# Patient Record
Sex: MALE | Race: WHITE | NOT HISPANIC OR LATINO | Employment: OTHER | URBAN - METROPOLITAN AREA
[De-identification: names, ages, dates, MRNs, and addresses within clinical notes are randomized per-mention and may not be internally consistent; named-entity substitution may affect disease eponyms.]

---

## 2019-03-08 LAB
CHOLESTEROL, TOTAL: 135 (ref 100–199)
HDLC SERPL-MCNC: 48 MG/DL
LDLC SERPL CALC-MCNC: 75 MG/DL (ref 0–99)
TRIGL SERPL-MCNC: 61 MG/DL (ref 0–149)
VLDL CHOLESTEROL: 12 MG/DL (ref 5–40)

## 2020-05-27 RX ORDER — METFORMIN HYDROCHLORIDE 500 MG/1
500 TABLET, EXTENDED RELEASE ORAL DAILY
Qty: 90 TABLET | Refills: 3 | Status: SHIPPED | OUTPATIENT
Start: 2020-05-27

## 2020-05-27 RX ORDER — METFORMIN HYDROCHLORIDE 500 MG/1
TABLET, EXTENDED RELEASE ORAL
Qty: 90 TABLET | Refills: 3 | Status: SHIPPED | OUTPATIENT
Start: 2020-05-27 | End: 2020-05-27 | Stop reason: SDUPTHER

## 2020-09-22 ENCOUNTER — OFFICE VISIT (OUTPATIENT)
Dept: INTERNAL MEDICINE | Facility: CLINIC | Age: 65
End: 2020-09-22
Payer: COMMERCIAL

## 2020-09-22 VITALS
HEART RATE: 77 BPM | WEIGHT: 271.5 LBS | SYSTOLIC BLOOD PRESSURE: 137 MMHG | TEMPERATURE: 98 F | HEIGHT: 71 IN | DIASTOLIC BLOOD PRESSURE: 81 MMHG | BODY MASS INDEX: 38.01 KG/M2

## 2020-09-22 DIAGNOSIS — Z11.59 ENCOUNTER FOR HEPATITIS C SCREENING TEST FOR LOW RISK PATIENT: ICD-10-CM

## 2020-09-22 DIAGNOSIS — G47.33 OBSTRUCTIVE SLEEP APNEA (ADULT) (PEDIATRIC): ICD-10-CM

## 2020-09-22 DIAGNOSIS — J44.9 CHRONIC OBSTRUCTIVE PULMONARY DISEASE, UNSPECIFIED COPD TYPE: ICD-10-CM

## 2020-09-22 DIAGNOSIS — H91.93 BILATERAL HEARING LOSS, UNSPECIFIED HEARING LOSS TYPE: ICD-10-CM

## 2020-09-22 DIAGNOSIS — Z11.4 ENCOUNTER FOR SCREENING FOR HIV: ICD-10-CM

## 2020-09-22 DIAGNOSIS — K21.9 GASTRO-ESOPHAGEAL REFLUX DISEASE WITHOUT ESOPHAGITIS: ICD-10-CM

## 2020-09-22 DIAGNOSIS — E78.00 PURE HYPERCHOLESTEROLEMIA, UNSPECIFIED: ICD-10-CM

## 2020-09-22 DIAGNOSIS — I10 ESSENTIAL (PRIMARY) HYPERTENSION: Primary | ICD-10-CM

## 2020-09-22 DIAGNOSIS — E66.01 CLASS 2 SEVERE OBESITY DUE TO EXCESS CALORIES WITH SERIOUS COMORBIDITY AND BODY MASS INDEX (BMI) OF 37.0 TO 37.9 IN ADULT: ICD-10-CM

## 2020-09-22 DIAGNOSIS — Z12.11 COLON CANCER SCREENING: ICD-10-CM

## 2020-09-22 DIAGNOSIS — N52.9 ERECTILE DYSFUNCTION, UNSPECIFIED ERECTILE DYSFUNCTION TYPE: ICD-10-CM

## 2020-09-22 DIAGNOSIS — R73.03 PREDIABETES: ICD-10-CM

## 2020-09-22 DIAGNOSIS — M17.0 BILATERAL PRIMARY OSTEOARTHRITIS OF KNEE: ICD-10-CM

## 2020-09-22 DIAGNOSIS — R06.02 SOB (SHORTNESS OF BREATH): ICD-10-CM

## 2020-09-22 PROCEDURE — 99214 PR OFFICE/OUTPT VISIT, EST, LEVL IV, 30-39 MIN: ICD-10-PCS | Mod: 25,S$GLB,, | Performed by: INTERNAL MEDICINE

## 2020-09-22 PROCEDURE — 90471 FLU VACCINE (QUAD) GREATER THAN OR EQUAL TO 3YO PRESERVATIVE FREE IM: ICD-10-PCS | Mod: S$GLB,,, | Performed by: INTERNAL MEDICINE

## 2020-09-22 PROCEDURE — 3008F BODY MASS INDEX DOCD: CPT | Mod: CPTII,S$GLB,, | Performed by: INTERNAL MEDICINE

## 2020-09-22 PROCEDURE — 90686 IIV4 VACC NO PRSV 0.5 ML IM: CPT | Mod: S$GLB,,, | Performed by: INTERNAL MEDICINE

## 2020-09-22 PROCEDURE — 99214 OFFICE O/P EST MOD 30 MIN: CPT | Mod: 25,S$GLB,, | Performed by: INTERNAL MEDICINE

## 2020-09-22 PROCEDURE — 90471 IMMUNIZATION ADMIN: CPT | Mod: S$GLB,,, | Performed by: INTERNAL MEDICINE

## 2020-09-22 PROCEDURE — 90686 FLU VACCINE (QUAD) GREATER THAN OR EQUAL TO 3YO PRESERVATIVE FREE IM: ICD-10-PCS | Mod: S$GLB,,, | Performed by: INTERNAL MEDICINE

## 2020-09-22 PROCEDURE — 3008F PR BODY MASS INDEX (BMI) DOCUMENTED: ICD-10-PCS | Mod: CPTII,S$GLB,, | Performed by: INTERNAL MEDICINE

## 2020-09-22 RX ORDER — IPRATROPIUM BROMIDE AND ALBUTEROL 20; 100 UG/1; UG/1
1 SPRAY, METERED RESPIRATORY (INHALATION) 4 TIMES DAILY
COMMUNITY
End: 2020-09-22

## 2020-09-22 RX ORDER — ALBUTEROL SULFATE 90 UG/1
2 AEROSOL, METERED RESPIRATORY (INHALATION) EVERY 6 HOURS PRN
Qty: 18 G | Refills: 0 | Status: SHIPPED | OUTPATIENT
Start: 2020-09-22 | End: 2022-01-11 | Stop reason: SDUPTHER

## 2020-09-22 RX ORDER — ASPIRIN 81 MG/1
81 TABLET ORAL NIGHTLY
COMMUNITY

## 2020-09-22 RX ORDER — LOSARTAN POTASSIUM 100 MG/1
100 TABLET ORAL NIGHTLY
Qty: 90 TABLET | Refills: 3 | Status: SHIPPED | OUTPATIENT
Start: 2020-09-22 | End: 2021-08-12

## 2020-09-22 RX ORDER — METFORMIN HYDROCHLORIDE 500 MG/1
500 TABLET ORAL 2 TIMES DAILY WITH MEALS
COMMUNITY
End: 2020-09-28 | Stop reason: SDUPTHER

## 2020-09-22 RX ORDER — ROSUVASTATIN CALCIUM 20 MG/1
20 TABLET, COATED ORAL NIGHTLY
COMMUNITY
Start: 2020-08-23 | End: 2022-01-11 | Stop reason: SDUPTHER

## 2020-09-22 RX ORDER — OMEPRAZOLE 20 MG/1
20 CAPSULE, DELAYED RELEASE ORAL EVERY MORNING
COMMUNITY
Start: 2020-08-29 | End: 2022-01-11 | Stop reason: SDUPTHER

## 2020-09-22 RX ORDER — FLUTICASONE FUROATE, UMECLIDINIUM BROMIDE AND VILANTEROL TRIFENATATE 100; 62.5; 25 UG/1; UG/1; UG/1
1 POWDER RESPIRATORY (INHALATION) EVERY MORNING
COMMUNITY
Start: 2020-07-18 | End: 2021-04-14

## 2020-09-22 NOTE — PROGRESS NOTES
Chief C/o:    Hypertension, Hyperlipidemia, Pre-diabetes, Pre-op Exam, and Shortness of Breath        Health Care Maintenance    Health Maintenance       Date Due Completion Date    Hepatitis C Screening 1955 ---    HIV Screening 12/05/1971 ---    TETANUS VACCINE 12/05/1974 ---    Shingles Vaccine (1 of 2) 12/05/2006 ---    Colorectal Cancer Screening 07/25/2018 7/25/2008 (Done)    Override on 7/25/2008: Done    Lipid Panel 03/09/2024 3/9/2019 (Done)    Override on 3/9/2019: Done                 HISTORY OF PRESENT ILLNESS:    ALCIDES Keys is a 64 y.o. male who presents to the clinic today for Hypertension, Hyperlipidemia, Pre-diabetes, Pre-op Exam, and Shortness of Breath  .  Patient shortness of breath is unchanged over a long time, he attributed it to his COPD.  Patient has obstructive sleep apnea but is unable to use his CPAP, he could not tolerate in the past.  Patient denies chest pain, there is no nausea, no vomiting, no fever, no chills.                  ALLERGIES AND MEDICATIONS: updated and reviewed.  Review of patient's allergies indicates:  No Known Allergies  Medication List with Changes/Refills   New Medications    ALBUTEROL (VENTOLIN HFA) 90 MCG/ACTUATION INHALER    Inhale 2 puffs into the lungs every 6 (six) hours as needed for Wheezing. Rescue    LOSARTAN (COZAAR) 100 MG TABLET    Take 1 tablet (100 mg total) by mouth every evening.   Current Medications    ASPIRIN (ECOTRIN) 81 MG EC TABLET    Take 81 mg by mouth once daily.    CARVEDILOL (COREG) 12.5 MG TABLET    TAKE 1 TABLET IN THE MORNING AND 1 TABLET AT NIGHT    METFORMIN (GLUCOPHAGE) 500 MG TABLET    Take 500 mg by mouth 2 (two) times daily with meals.    MULTIVIT-MIN/FA/LYCOPEN/LUTEIN (CENTRUM SILVER MEN ORAL)    Take by mouth.    OMEPRAZOLE (PRILOSEC) 20 MG CAPSULE        ROSUVASTATIN (CRESTOR) 20 MG TABLET        TRELEGY ELLIPTA 100-62.5-25 MCG DSDV        VITAMIN B COMPLEX-FOLIC ACID 50 MCG TAB    Take by mouth.    Discontinued Medications    IPRATROPIUM-ALBUTEROL (COMBIVENT RESPIMAT)  MCG/ACTUATION INHALER    Inhale 1 puff into the lungs 4 (four) times daily. Rescue    LOSARTAN (COZAAR) 50 MG TABLET    TAKE 1 TABLET EVERY EVENING             CARE TEAM:    Patient Care Team:  Rica Martinez MD as PCP - General (Internal Medicine)         REVIEW OF SYSTEMS:    Review of Systems   Constitutional: Negative for appetite change, chills, diaphoresis, fatigue, fever and unexpected weight change.   HENT: Negative for congestion, drooling, ear discharge, ear pain, facial swelling, hearing loss, nosebleeds, rhinorrhea, sinus pain, sneezing, sore throat, tinnitus, trouble swallowing and voice change.    Eyes: Negative for pain, discharge, redness, itching and visual disturbance.   Respiratory: Positive for shortness of breath. Negative for cough, choking, chest tightness, wheezing and stridor.    Cardiovascular: Negative for chest pain, palpitations and leg swelling.   Gastrointestinal: Negative for abdominal distention, abdominal pain, blood in stool, constipation, diarrhea, nausea and vomiting.   Endocrine: Negative for cold intolerance, heat intolerance, polydipsia, polyphagia and polyuria.   Genitourinary: Negative for difficulty urinating, dysuria, flank pain, frequency, hematuria and urgency.   Musculoskeletal: Negative for arthralgias, back pain, gait problem, joint swelling, myalgias, neck pain and neck stiffness.   Skin: Negative for color change, pallor, rash and wound.   Allergic/Immunologic: Negative for environmental allergies, food allergies and immunocompromised state.   Neurological: Negative for dizziness, tremors, seizures, syncope, speech difficulty, weakness, light-headedness, numbness and headaches.   Hematological: Negative for adenopathy. Does not bruise/bleed easily.   Psychiatric/Behavioral: Negative for agitation, behavioral problems, confusion, decreased concentration, dysphoric mood,  "hallucinations, sleep disturbance and suicidal ideas. The patient is not nervous/anxious.          PHYSICAL EXAM:    Vitals:    09/22/20 0927   BP: 137/81   Pulse: 77   Temp: 97.5 °F (36.4 °C)     Weight: 123.1 kg (271 lb 7.9 oz)   Height: 5' 11" (180.3 cm)   Body mass index is 37.87 kg/m².  Vitals:    09/22/20 0927   BP: 137/81   Pulse: 77   Temp: 97.5 °F (36.4 °C)   TempSrc: Temporal   Weight: 123.1 kg (271 lb 7.9 oz)   Height: 5' 11" (1.803 m)   PainSc: 0-No pain          Physical Exam   Constitutional: He is oriented to person, place, and time. He appears well-developed, well-nourished and obese.  Non-toxic appearance. He does not appear ill. No distress.   Comfortable, cooperative, in no apparent distress.   HENT:   Head: Normocephalic and atraumatic.   Right Ear: Tympanic membrane, external ear and ear canal normal.   Left Ear: Tympanic membrane, external ear and ear canal normal.   Nose: Nose normal. No rhinorrhea or nasal congestion.   Mouth/Throat: Oropharynx is clear and moist. Mucous membranes are moist. No oropharyngeal exudate or posterior oropharyngeal erythema. Oropharynx is clear.   Eyes: Pupils are equal, round, and reactive to light. Conjunctivae are normal. Right eye exhibits no discharge. Left eye exhibits no discharge. No scleral icterus.   Neck: Normal range of motion. Neck supple. No JVD present. No muscular tenderness present. No neck rigidity. No tracheal deviation present. No thyromegaly present.   Cardiovascular: Normal rate, regular rhythm, normal heart sounds and normal pulses. Exam reveals no gallop and no friction rub.   No murmur heard.  Pulmonary/Chest: Effort normal and breath sounds normal. No stridor. No respiratory distress. He has no wheezes. He has no rhonchi. He has no rales. He exhibits no tenderness.   Abdominal: Soft. Bowel sounds are normal. He exhibits no distension and no mass. There is no abdominal tenderness. There is no rebound and no guarding. No hernia. "   Genitourinary:    Genitourinary Comments: No costovertebral angle tenderness.     Musculoskeletal: Normal range of motion.         General: No swelling, tenderness, deformity or signs of injury.      Right lower leg: No edema.      Left lower leg: No edema.      Comments: Crepitation knees.   Lymphadenopathy:     He has no cervical adenopathy.   Neurological: He is alert and oriented to person, place, and time. He displays no weakness and normal reflexes. No cranial nerve deficit or sensory deficit. He exhibits normal muscle tone. Coordination and gait normal.   Skin: Skin is warm and dry. Capillary refill takes less than 2 seconds. No bruising, no lesion and no rash noted. He is not diaphoretic. No erythema. No jaundice or pallor.   Psychiatric: His behavior is normal. Mood, judgment and thought content normal.   Nursing note and vitals reviewed.         Labs:    No results found for: GLU, NA, K, CL, CO2, BUN, CREATININE, CALCIUM, PROT, ALBUMIN, BILITOT, ALKPHOS, AST, ALT, ANIONGAP, ESTGFRAFRICA, EGFRNONAA  No results found for: WBC, RBC, HGB, HCT, MCV, RDW, PLT   No results found for: CHOL, TRIG, HDL, LDLCALC, TOTALCHOLEST  No results found for: TSH  No results found for: HGBA1C, ESTIMATEDAVG       ASSESSMENT & PLAN:    1. Essential (primary) hypertension  - losartan (COZAAR) 100 MG tablet; Take 1 tablet (100 mg total) by mouth every evening.  Dispense: 90 tablet; Refill: 3  - Comprehensive metabolic panel; Future  - CBC auto differential; Future  - Comprehensive metabolic panel  - CBC auto differential    2. Chronic obstructive pulmonary disease, unspecified COPD type  - TRELEGY ELLIPTA 100-62.5-25 mcg DsDv  - albuterol (VENTOLIN HFA) 90 mcg/actuation inhaler; Inhale 2 puffs into the lungs every 6 (six) hours as needed for Wheezing. Rescue  Dispense: 18 g; Refill: 0    3. Prediabetes  - metFORMIN (GLUCOPHAGE) 500 MG tablet; Take 500 mg by mouth 2 (two) times daily with meals.  - Comprehensive metabolic panel;  Future  - Hemoglobin A1C; Future  - TSH; Future  - CBC auto differential; Future  - Comprehensive metabolic panel  - Hemoglobin A1C  - TSH  - CBC auto differential    4. Pure hypercholesterolemia, unspecified  - rosuvastatin (CRESTOR) 20 MG tablet  - Comprehensive metabolic panel; Future  - Lipid Panel; Future  - Comprehensive metabolic panel  - Lipid Panel    5. Bilateral hearing loss, unspecified hearing loss type    6. Obstructive sleep apnea (adult) (pediatric)    7. Gastro-esophageal reflux disease without esophagitis  - omeprazole (PRILOSEC) 20 MG capsule    8. Erectile dysfunction, unspecified erectile dysfunction type    9. Bilateral primary osteoarthritis of knee    10. SOB (shortness of breath)  - TRELEGY ELLIPTA 100-62.5-25 mcg DsDv    11. Class 2 severe obesity due to excess calories with serious comorbidity and body mass index (BMI) of 37.0 to 37.9 in adult    12. Colon cancer screening    13. Encounter for hepatitis C screening test for low risk patient  - Hepatitis C Antibody; Future  - Hepatitis C Antibody    14. Encounter for screening for HIV  - HIV 1/2 Ag/Ab (4th Gen); Future  - HIV 1/2 Ag/Ab (4th Gen)     Patient with multiple medical problems, he seems to be at baseline at this time, will continue his current medication.  Low-fat diet, increase vegetables, learn how to count calories, check weight every morning and keep a diet and weight diary.  Bring the diary next visit.  Try to identify one specific food item or habit that you can improve, try to stick to it and add another item after 2-4 weeks interval. If you are not improving as you wish, bring your food diary and we will try, together, find something specific that we can work on.  General measures: low salt, low fat, vegetables and fruits rich diet. Check BP before taking BP medications and follow precautions and guidelines. Keep a records of your BP and Pulse readings and bring the chart to the office next visit. If BP is consistently  above 130/80, I recommend that you book a sooner appointment to address the issue.  Patient so cardiologist and wants clearance for cataract surgery.  Cleared for cataract surgery.  Paperwork for clearance was signed and copies in media section of the HR.      Orders Placed This Encounter   Procedures    Influenza - Quadrivalent *Preferred* (6 months+) (PF)    Comprehensive metabolic panel    Lipid Panel    Hemoglobin A1C    Hepatitis C Antibody    TSH    HIV 1/2 Ag/Ab (4th Gen)    CBC auto differential      Follow up in about 2 months (around 11/22/2020). or sooner as needed.    Patient was counseled and questions and concerns were addressed.    Please note:  Parts of this report were done using a dictation software, voice to text, and sometimes the text contains some uncorrected words or sentences that are missed during revision.

## 2020-09-28 DIAGNOSIS — R73.03 PREDIABETES: ICD-10-CM

## 2020-09-28 RX ORDER — METFORMIN HYDROCHLORIDE 500 MG/1
500 TABLET ORAL 2 TIMES DAILY WITH MEALS
Qty: 90 TABLET | Refills: 1 | Status: SHIPPED | OUTPATIENT
Start: 2020-09-28 | End: 2020-10-01 | Stop reason: SDUPTHER

## 2020-10-01 DIAGNOSIS — R73.03 PREDIABETES: ICD-10-CM

## 2020-10-02 RX ORDER — METFORMIN HYDROCHLORIDE 500 MG/1
500 TABLET ORAL 2 TIMES DAILY WITH MEALS
Qty: 180 TABLET | Refills: 3 | Status: SHIPPED | OUTPATIENT
Start: 2020-10-02 | End: 2020-10-07 | Stop reason: SDUPTHER

## 2020-10-07 DIAGNOSIS — R73.03 PREDIABETES: ICD-10-CM

## 2020-10-07 RX ORDER — METFORMIN HYDROCHLORIDE 500 MG/1
500 TABLET ORAL 2 TIMES DAILY WITH MEALS
Qty: 180 TABLET | Refills: 3 | Status: SHIPPED | OUTPATIENT
Start: 2020-10-07 | End: 2022-01-11

## 2020-11-11 LAB
ALBUMIN SERPL-MCNC: 4.2 G/DL (ref 3.8–4.8)
ALBUMIN/GLOB SERPL: 1.4 {RATIO} (ref 1.2–2.2)
ALP SERPL-CCNC: 58 IU/L (ref 39–117)
ALT SERPL-CCNC: 21 IU/L (ref 0–44)
AST SERPL-CCNC: 16 IU/L (ref 0–40)
BASOPHILS # BLD AUTO: 0 X10E3/UL (ref 0–0.2)
BASOPHILS NFR BLD AUTO: 0 %
BILIRUB SERPL-MCNC: 0.4 MG/DL (ref 0–1.2)
BUN SERPL-MCNC: 12 MG/DL (ref 8–27)
BUN/CREAT SERPL: 13 (ref 10–24)
CALCIUM SERPL-MCNC: 9.5 MG/DL (ref 8.6–10.2)
CHLORIDE SERPL-SCNC: 103 MMOL/L (ref 96–106)
CHOLEST SERPL-MCNC: 131 MG/DL (ref 100–199)
CO2 SERPL-SCNC: 26 MMOL/L (ref 20–29)
CREAT SERPL-MCNC: 0.96 MG/DL (ref 0.76–1.27)
EOSINOPHIL # BLD AUTO: 0.2 X10E3/UL (ref 0–0.4)
EOSINOPHIL NFR BLD AUTO: 3 %
ERYTHROCYTE [DISTWIDTH] IN BLOOD BY AUTOMATED COUNT: 14.3 % (ref 11.6–15.4)
GLOBULIN SER CALC-MCNC: 2.9 G/DL (ref 1.5–4.5)
GLUCOSE SERPL-MCNC: 114 MG/DL (ref 65–99)
HBA1C MFR BLD: 6.2 % (ref 4.8–5.6)
HCT VFR BLD AUTO: 38.8 % (ref 37.5–51)
HCV AB S/CO SERPL IA: <0.1 S/CO RATIO (ref 0–0.9)
HDLC SERPL-MCNC: 47 MG/DL
HGB BLD-MCNC: 12.7 G/DL (ref 13–17.7)
HIV 1+2 AB+HIV1 P24 AG SERPL QL IA: NON REACTIVE
IMM GRANULOCYTES # BLD AUTO: 0 X10E3/UL (ref 0–0.1)
IMM GRANULOCYTES NFR BLD AUTO: 0 %
INTERPRETATION: NORMAL
LDLC SERPL CALC-MCNC: 65 MG/DL (ref 0–99)
LYMPHOCYTES # BLD AUTO: 1.9 X10E3/UL (ref 0.7–3.1)
LYMPHOCYTES NFR BLD AUTO: 29 %
MCH RBC QN AUTO: 27.5 PG (ref 26.6–33)
MCHC RBC AUTO-ENTMCNC: 32.7 G/DL (ref 31.5–35.7)
MCV RBC AUTO: 84 FL (ref 79–97)
MONOCYTES # BLD AUTO: 0.4 X10E3/UL (ref 0.1–0.9)
MONOCYTES NFR BLD AUTO: 7 %
NEUTROPHILS # BLD AUTO: 3.9 X10E3/UL (ref 1.4–7)
NEUTROPHILS NFR BLD AUTO: 61 %
PLATELET # BLD AUTO: 222 X10E3/UL (ref 150–450)
POTASSIUM SERPL-SCNC: 5.2 MMOL/L (ref 3.5–5.2)
PROT SERPL-MCNC: 7.1 G/DL (ref 6–8.5)
RBC # BLD AUTO: 4.62 X10E6/UL (ref 4.14–5.8)
SODIUM SERPL-SCNC: 139 MMOL/L (ref 134–144)
TRIGL SERPL-MCNC: 100 MG/DL (ref 0–149)
TSH SERPL DL<=0.005 MIU/L-ACNC: 1.01 UIU/ML (ref 0.45–4.5)
VLDLC SERPL CALC-MCNC: 19 MG/DL (ref 5–40)
WBC # BLD AUTO: 6.4 X10E3/UL (ref 3.4–10.8)

## 2020-11-16 ENCOUNTER — OFFICE VISIT (OUTPATIENT)
Dept: URGENT CARE | Facility: CLINIC | Age: 65
End: 2020-11-16
Payer: COMMERCIAL

## 2020-11-16 VITALS
SYSTOLIC BLOOD PRESSURE: 157 MMHG | OXYGEN SATURATION: 100 % | DIASTOLIC BLOOD PRESSURE: 73 MMHG | WEIGHT: 271 LBS | BODY MASS INDEX: 37.94 KG/M2 | HEART RATE: 73 BPM | TEMPERATURE: 98 F | RESPIRATION RATE: 18 BRPM | HEIGHT: 71 IN

## 2020-11-16 DIAGNOSIS — T14.8XXA PUNCTURE WOUND: Primary | ICD-10-CM

## 2020-11-16 PROCEDURE — 73630 XR FOOT COMPLETE 3 VIEW RIGHT: ICD-10-PCS | Mod: RT,S$GLB,, | Performed by: RADIOLOGY

## 2020-11-16 PROCEDURE — 99213 OFFICE O/P EST LOW 20 MIN: CPT | Mod: 25,S$GLB,, | Performed by: NURSE PRACTITIONER

## 2020-11-16 PROCEDURE — 90715 TDAP VACCINE 7 YRS/> IM: CPT | Mod: S$GLB,,, | Performed by: EMERGENCY MEDICINE

## 2020-11-16 PROCEDURE — 3008F BODY MASS INDEX DOCD: CPT | Mod: CPTII,S$GLB,, | Performed by: NURSE PRACTITIONER

## 2020-11-16 PROCEDURE — 73630 X-RAY EXAM OF FOOT: CPT | Mod: RT,S$GLB,, | Performed by: RADIOLOGY

## 2020-11-16 PROCEDURE — 3008F PR BODY MASS INDEX (BMI) DOCUMENTED: ICD-10-PCS | Mod: CPTII,S$GLB,, | Performed by: NURSE PRACTITIONER

## 2020-11-16 PROCEDURE — 90471 IMMUNIZATION ADMIN: CPT | Mod: S$GLB,,, | Performed by: EMERGENCY MEDICINE

## 2020-11-16 PROCEDURE — 90715 TDAP VACCINE GREATER THAN OR EQUAL TO 7YO IM: ICD-10-PCS | Mod: S$GLB,,, | Performed by: EMERGENCY MEDICINE

## 2020-11-16 PROCEDURE — 90471 TDAP VACCINE GREATER THAN OR EQUAL TO 7YO IM: ICD-10-PCS | Mod: S$GLB,,, | Performed by: EMERGENCY MEDICINE

## 2020-11-16 PROCEDURE — 99213 PR OFFICE/OUTPT VISIT, EST, LEVL III, 20-29 MIN: ICD-10-PCS | Mod: 25,S$GLB,, | Performed by: NURSE PRACTITIONER

## 2020-11-16 RX ORDER — LEVOFLOXACIN 500 MG/1
500 TABLET, FILM COATED ORAL DAILY
Qty: 5 TABLET | Refills: 0 | Status: SHIPPED | OUTPATIENT
Start: 2020-11-16 | End: 2020-11-21

## 2020-11-16 NOTE — PROGRESS NOTES
"Subjective:       Patient ID: Markell Keys is a 64 y.o. male.    Vitals:  height is 5' 11" (1.803 m) and weight is 122.9 kg (271 lb). His tympanic temperature is 98 °F (36.7 °C). His blood pressure is 157/73 (abnormal) and his pulse is 73. His respiration is 18 and oxygen saturation is 100%.     Chief Complaint: Puncture Wound    Pt was working in his garage this morning about 1100 hours.  Pt stepped onto a rusted nail which went thru his shoe.  Pt suspects about 1/2 inch puncture wound going thru the ball of his right foot near 1st digit.  Pt just wrapped it up and came straight to urgent care.  Pt states he does not think the keenan nail broke apart.  Pt states he does not know his tetanus shot status.    Laceration   The incident occurred 3 to 6 hours ago. The laceration is located on the right foot. The laceration is 1 cm in size. The laceration mechanism was a nail. The pain is at a severity of 4/10. The pain is moderate. The pain has been constant since onset. He reports no foreign bodies present. His tetanus status is unknown.       Musculoskeletal: Positive for pain, trauma and pain with walking. Negative for arthritis and gout.   Skin: Positive for laceration, puncture wound and erythema (2mm laceration right metatarsal).   Neurological: Negative for passing out.       Objective:      Physical Exam   Constitutional: He is oriented to person, place, and time.   HENT:   Head: Normocephalic and atraumatic.   Cardiovascular: Bradycardia present.   Pulmonary/Chest: Effort normal. No respiratory distress.   Abdominal: Normal appearance.   Musculoskeletal:      Right foot: Laceration present.   Neurological: He is alert and oriented to person, place, and time.   Skin: Skin is dry. Lacerations - lower ext.:  right footLesions:  erythema (2mm laceration right metatarsal)Psychiatric: His behavior is normal. Mood normal.           X-ray Foot Complete Right    Result Date: 11/16/2020  EXAMINATION: XR FOOT COMPLETE 3 " VIEW RIGHT CLINICAL HISTORY: . Other injury of unspecified body region, initial encounter TECHNIQUE: AP, lateral, and oblique views of the right foot were performed. COMPARISON: None FINDINGS: Multiple views of the right foot with an area of concern identified as the 1st metatarsophalangeal joint reveals no apparent evidence of an acute fracture injury of the phalanges or the metatarsals.  The 1st metatarsophalangeal joint is unremarkable.  No indication of inflammation or soft tissue swelling.  The articular surfaces are smooth throughout the foot.  The lateral projection reveals osteophytes developing on the calcaneus at the attachment point of the Achilles tendon and plantar fascia.  In aero directed to the plantar surface of the foot does not identify any specific focal abnormality.     No obvious evidence of an acute osseous injury involving the right foot. Minor osteoarthritic changes of the calcaneus. No specific gross abnormality noted in the area of concern as identified with an arrow. Electronically signed by: Mayank Cason Date:    11/16/2020 Time:    14:38    Assessment:       1. Puncture wound        Plan:       Wound cleaned.  Tdap.  Antibiotics to prevent infection.  Personally read x ray.  No fracture noted.    Puncture wound  -     X-Ray Foot Complete Right; Future; Expected date: 11/16/2020  -     (In Office Administered) Tdap Vaccine  -     levoFLOXacin (LEVAQUIN) 500 MG tablet; Take 1 tablet (500 mg total) by mouth once daily. for 5 days  Dispense: 5 tablet; Refill: 0         Patient Instructions     Puncture Wound: Foot       A puncture wound occurs when a pointed object (such as a nail) pushes into the skin. It may go into the tissues below the skin of the foot, including fat and muscle. This type of wound is narrow and deep. They can be hard to clean. Puncture wounds are at high risk for becoming infected. One type of serious infection is more likely if you were wearing a rubber-soled shoe at  the time of injury. Bacteria from the sole of the shoe may be dragged into the wound. Symptoms of infection may appear as late as 2 to 3 weeks after the injury. Be sure to watch for symptoms of infection and call your healthcare provider right away if any them appear.  X-rays may be done to see whether any objects remain under the skin. Your may also need a tetanus shot. This is given if you are not up to date on this vaccination and the object that caused the wound may lead to tetanus.  Puncture wounds can easily become infected.   Home care  · When you sit or lie down, raise the foot above the level of your heart. This helps reduce swelling and pain.  · Do not put weight on the injured foot if it hurts to do so or if you were told to keep weight off the injury.  · Your healthcare provider may prescribe an antibiotic. This is to help prevent infection. Follow all instructions for taking this medicine. Take the medicine every day until it is gone or you are told to stop. You should not have any left over.  · The healthcare provider may prescribe medicines for pain. Follow instructions for taking them.  · You can take acetaminophen or ibuprofen for pain, unless you were given a different pain medicine to use.   · Follow the healthcare providers instructions on how to care for the wound.  · Keep the wound clean and dry. Do not get the wound wet until you are told it is okay to do so. If the area gets wet, gently pat it dry with a clean cloth. Replace the wet bandage with a dry one.  · If a bandage was applied and it becomes wet or dirty, replace it. Otherwise, leave it in place for the first 24 hours.  · Once you can get the wound wet, you may shower as usual but do not soak the wound in water (no tub baths or swimming)  · Check the wound daily for symptoms of infection. These include:  ¨ Increasing redness or swelling around the wound  ¨ Increased warmth of the wound  ¨ Worsening pain  ¨ Red streaking lines away  from the wound  ¨ Draining pus  Follow-up care  Follow up with your healthcare provider as advised.   When to seek medical advice  Call your healthcare provider right away if any of these occur:  · Any symptoms of infection (listed above)  · Fever of 100.4°F (38.ºC) or higher, or as directed by your healthcare provider  · Wound changes colors  · Numbness around the wound  · Decreased movement around the injured area  Date Last Reviewed: 8/24/2015  © 7375-5015 [employer]. [address] [place], [place] [id number]. All rights reserved. This information is not intended as a substitute for professional medical care. Always follow your healthcare professional's instructions.

## 2020-11-16 NOTE — PATIENT INSTRUCTIONS
Puncture Wound: Foot       A puncture wound occurs when a pointed object (such as a nail) pushes into the skin. It may go into the tissues below the skin of the foot, including fat and muscle. This type of wound is narrow and deep. They can be hard to clean. Puncture wounds are at high risk for becoming infected. One type of serious infection is more likely if you were wearing a rubber-soled shoe at the time of injury. Bacteria from the sole of the shoe may be dragged into the wound. Symptoms of infection may appear as late as 2 to 3 weeks after the injury. Be sure to watch for symptoms of infection and call your healthcare provider right away if any them appear.  X-rays may be done to see whether any objects remain under the skin. Your may also need a tetanus shot. This is given if you are not up to date on this vaccination and the object that caused the wound may lead to tetanus.  Puncture wounds can easily become infected.   Home care  · When you sit or lie down, raise the foot above the level of your heart. This helps reduce swelling and pain.  · Do not put weight on the injured foot if it hurts to do so or if you were told to keep weight off the injury.  · Your healthcare provider may prescribe an antibiotic. This is to help prevent infection. Follow all instructions for taking this medicine. Take the medicine every day until it is gone or you are told to stop. You should not have any left over.  · The healthcare provider may prescribe medicines for pain. Follow instructions for taking them.  · You can take acetaminophen or ibuprofen for pain, unless you were given a different pain medicine to use.   · Follow the healthcare providers instructions on how to care for the wound.  · Keep the wound clean and dry. Do not get the wound wet until you are told it is okay to do so. If the area gets wet, gently pat it dry with a clean cloth. Replace the wet bandage with a dry one.  · If a bandage was applied and it  becomes wet or dirty, replace it. Otherwise, leave it in place for the first 24 hours.  · Once you can get the wound wet, you may shower as usual but do not soak the wound in water (no tub baths or swimming)  · Check the wound daily for symptoms of infection. These include:  ¨ Increasing redness or swelling around the wound  ¨ Increased warmth of the wound  ¨ Worsening pain  ¨ Red streaking lines away from the wound  ¨ Draining pus  Follow-up care  Follow up with your healthcare provider as advised.   When to seek medical advice  Call your healthcare provider right away if any of these occur:  · Any symptoms of infection (listed above)  · Fever of 100.4°F (38.ºC) or higher, or as directed by your healthcare provider  · Wound changes colors  · Numbness around the wound  · Decreased movement around the injured area  Date Last Reviewed: 8/24/2015  © 1654-0578 [employer]. [address] [place] [address], [place] [address]. All rights reserved. This information is not intended as a substitute for professional medical care. Always follow your healthcare professional's instructions.

## 2021-03-03 ENCOUNTER — OFFICE VISIT (OUTPATIENT)
Dept: INTERNAL MEDICINE | Facility: CLINIC | Age: 66
End: 2021-03-03
Payer: COMMERCIAL

## 2021-03-03 VITALS
DIASTOLIC BLOOD PRESSURE: 74 MMHG | HEIGHT: 71 IN | TEMPERATURE: 97 F | HEART RATE: 73 BPM | WEIGHT: 259.06 LBS | SYSTOLIC BLOOD PRESSURE: 126 MMHG | BODY MASS INDEX: 36.27 KG/M2

## 2021-03-03 DIAGNOSIS — J44.9 CHRONIC OBSTRUCTIVE PULMONARY DISEASE, UNSPECIFIED COPD TYPE: ICD-10-CM

## 2021-03-03 DIAGNOSIS — E78.00 PURE HYPERCHOLESTEROLEMIA, UNSPECIFIED: ICD-10-CM

## 2021-03-03 DIAGNOSIS — G47.33 OBSTRUCTIVE SLEEP APNEA (ADULT) (PEDIATRIC): ICD-10-CM

## 2021-03-03 DIAGNOSIS — N52.9 ERECTILE DYSFUNCTION, UNSPECIFIED ERECTILE DYSFUNCTION TYPE: ICD-10-CM

## 2021-03-03 DIAGNOSIS — I10 ESSENTIAL (PRIMARY) HYPERTENSION: Primary | ICD-10-CM

## 2021-03-03 DIAGNOSIS — Z12.11 ENCOUNTER FOR COLORECTAL CANCER SCREENING: ICD-10-CM

## 2021-03-03 DIAGNOSIS — R06.02 SOB (SHORTNESS OF BREATH): ICD-10-CM

## 2021-03-03 DIAGNOSIS — Z12.12 ENCOUNTER FOR COLORECTAL CANCER SCREENING: ICD-10-CM

## 2021-03-03 DIAGNOSIS — E66.01 CLASS 2 SEVERE OBESITY DUE TO EXCESS CALORIES WITH SERIOUS COMORBIDITY AND BODY MASS INDEX (BMI) OF 35.0 TO 35.9 IN ADULT: ICD-10-CM

## 2021-03-03 DIAGNOSIS — M17.0 BILATERAL PRIMARY OSTEOARTHRITIS OF KNEE: ICD-10-CM

## 2021-03-03 DIAGNOSIS — R73.03 PREDIABETES: ICD-10-CM

## 2021-03-03 DIAGNOSIS — K21.9 GASTRO-ESOPHAGEAL REFLUX DISEASE WITHOUT ESOPHAGITIS: ICD-10-CM

## 2021-03-03 DIAGNOSIS — D64.9 ANEMIA, UNSPECIFIED TYPE: ICD-10-CM

## 2021-03-03 DIAGNOSIS — H91.93 BILATERAL HEARING LOSS, UNSPECIFIED HEARING LOSS TYPE: ICD-10-CM

## 2021-03-03 PROCEDURE — 99214 OFFICE O/P EST MOD 30 MIN: CPT | Mod: S$GLB,,, | Performed by: INTERNAL MEDICINE

## 2021-03-03 PROCEDURE — 3078F DIAST BP <80 MM HG: CPT | Mod: CPTII,S$GLB,, | Performed by: INTERNAL MEDICINE

## 2021-03-03 PROCEDURE — 3008F PR BODY MASS INDEX (BMI) DOCUMENTED: ICD-10-PCS | Mod: CPTII,S$GLB,, | Performed by: INTERNAL MEDICINE

## 2021-03-03 PROCEDURE — 3074F PR MOST RECENT SYSTOLIC BLOOD PRESSURE < 130 MM HG: ICD-10-PCS | Mod: CPTII,S$GLB,, | Performed by: INTERNAL MEDICINE

## 2021-03-03 PROCEDURE — 1126F PR PAIN SEVERITY QUANTIFIED, NO PAIN PRESENT: ICD-10-PCS | Mod: S$GLB,,, | Performed by: INTERNAL MEDICINE

## 2021-03-03 PROCEDURE — 1126F AMNT PAIN NOTED NONE PRSNT: CPT | Mod: S$GLB,,, | Performed by: INTERNAL MEDICINE

## 2021-03-03 PROCEDURE — 3078F PR MOST RECENT DIASTOLIC BLOOD PRESSURE < 80 MM HG: ICD-10-PCS | Mod: CPTII,S$GLB,, | Performed by: INTERNAL MEDICINE

## 2021-03-03 PROCEDURE — 3074F SYST BP LT 130 MM HG: CPT | Mod: CPTII,S$GLB,, | Performed by: INTERNAL MEDICINE

## 2021-03-03 PROCEDURE — 3008F BODY MASS INDEX DOCD: CPT | Mod: CPTII,S$GLB,, | Performed by: INTERNAL MEDICINE

## 2021-03-03 PROCEDURE — 99214 PR OFFICE/OUTPT VISIT, EST, LEVL IV, 30-39 MIN: ICD-10-PCS | Mod: S$GLB,,, | Performed by: INTERNAL MEDICINE

## 2021-03-03 RX ORDER — SILDENAFIL 100 MG/1
100 TABLET, FILM COATED ORAL DAILY PRN
Qty: 30 TABLET | Refills: 1 | Status: SHIPPED | OUTPATIENT
Start: 2021-03-03 | End: 2021-03-31 | Stop reason: SDUPTHER

## 2021-03-03 RX ORDER — CHLORTHALIDONE 25 MG/1
25 TABLET ORAL NIGHTLY
COMMUNITY
End: 2021-06-30

## 2021-03-24 LAB
BASOPHILS # BLD AUTO: 0 X10E3/UL (ref 0–0.2)
BASOPHILS NFR BLD AUTO: 1 %
EOSINOPHIL # BLD AUTO: 0.1 X10E3/UL (ref 0–0.4)
EOSINOPHIL NFR BLD AUTO: 1 %
ERYTHROCYTE [DISTWIDTH] IN BLOOD BY AUTOMATED COUNT: 14.6 % (ref 11.6–15.4)
FERRITIN SERPL-MCNC: 18 NG/ML (ref 30–400)
HCT VFR BLD AUTO: 39.4 % (ref 37.5–51)
HGB BLD-MCNC: 12.8 G/DL (ref 13–17.7)
IMM GRANULOCYTES # BLD AUTO: 0 X10E3/UL (ref 0–0.1)
IMM GRANULOCYTES NFR BLD AUTO: 0 %
IRON SERPL-MCNC: 86 UG/DL (ref 38–169)
LYMPHOCYTES # BLD AUTO: 1.8 X10E3/UL (ref 0.7–3.1)
LYMPHOCYTES NFR BLD AUTO: 34 %
MCH RBC QN AUTO: 27.3 PG (ref 26.6–33)
MCHC RBC AUTO-ENTMCNC: 32.5 G/DL (ref 31.5–35.7)
MCV RBC AUTO: 84 FL (ref 79–97)
MONOCYTES # BLD AUTO: 0.4 X10E3/UL (ref 0.1–0.9)
MONOCYTES NFR BLD AUTO: 8 %
NEUTROPHILS # BLD AUTO: 3 X10E3/UL (ref 1.4–7)
NEUTROPHILS NFR BLD AUTO: 56 %
PLATELET # BLD AUTO: 230 X10E3/UL (ref 150–450)
RBC # BLD AUTO: 4.69 X10E6/UL (ref 4.14–5.8)
WBC # BLD AUTO: 5.3 X10E3/UL (ref 3.4–10.8)

## 2021-03-31 ENCOUNTER — OFFICE VISIT (OUTPATIENT)
Dept: INTERNAL MEDICINE | Facility: CLINIC | Age: 66
End: 2021-03-31
Payer: COMMERCIAL

## 2021-03-31 VITALS
SYSTOLIC BLOOD PRESSURE: 108 MMHG | BODY MASS INDEX: 34.9 KG/M2 | DIASTOLIC BLOOD PRESSURE: 68 MMHG | TEMPERATURE: 98 F | WEIGHT: 249.31 LBS | HEIGHT: 71 IN | HEART RATE: 73 BPM

## 2021-03-31 DIAGNOSIS — E78.00 PURE HYPERCHOLESTEROLEMIA, UNSPECIFIED: ICD-10-CM

## 2021-03-31 DIAGNOSIS — M17.0 BILATERAL PRIMARY OSTEOARTHRITIS OF KNEE: ICD-10-CM

## 2021-03-31 DIAGNOSIS — G47.33 OBSTRUCTIVE SLEEP APNEA (ADULT) (PEDIATRIC): ICD-10-CM

## 2021-03-31 DIAGNOSIS — Z00.01 ENCOUNTER FOR GENERAL ADULT MEDICAL EXAMINATION WITH ABNORMAL FINDINGS: Primary | ICD-10-CM

## 2021-03-31 DIAGNOSIS — N52.9 ERECTILE DYSFUNCTION, UNSPECIFIED ERECTILE DYSFUNCTION TYPE: ICD-10-CM

## 2021-03-31 DIAGNOSIS — I10 ESSENTIAL (PRIMARY) HYPERTENSION: ICD-10-CM

## 2021-03-31 DIAGNOSIS — E66.09 CLASS 1 OBESITY DUE TO EXCESS CALORIES WITH SERIOUS COMORBIDITY AND BODY MASS INDEX (BMI) OF 34.0 TO 34.9 IN ADULT: ICD-10-CM

## 2021-03-31 DIAGNOSIS — D64.9 ANEMIA, UNSPECIFIED TYPE: ICD-10-CM

## 2021-03-31 DIAGNOSIS — K21.9 GASTRO-ESOPHAGEAL REFLUX DISEASE WITHOUT ESOPHAGITIS: ICD-10-CM

## 2021-03-31 DIAGNOSIS — R73.03 PREDIABETES: ICD-10-CM

## 2021-03-31 DIAGNOSIS — J44.9 CHRONIC OBSTRUCTIVE PULMONARY DISEASE, UNSPECIFIED COPD TYPE: ICD-10-CM

## 2021-03-31 PROCEDURE — 3078F PR MOST RECENT DIASTOLIC BLOOD PRESSURE < 80 MM HG: ICD-10-PCS | Mod: CPTII,S$GLB,, | Performed by: INTERNAL MEDICINE

## 2021-03-31 PROCEDURE — 99396 PREV VISIT EST AGE 40-64: CPT | Mod: S$GLB,,, | Performed by: INTERNAL MEDICINE

## 2021-03-31 PROCEDURE — 3078F DIAST BP <80 MM HG: CPT | Mod: CPTII,S$GLB,, | Performed by: INTERNAL MEDICINE

## 2021-03-31 PROCEDURE — 1126F PR PAIN SEVERITY QUANTIFIED, NO PAIN PRESENT: ICD-10-PCS | Mod: S$GLB,,, | Performed by: INTERNAL MEDICINE

## 2021-03-31 PROCEDURE — 3008F PR BODY MASS INDEX (BMI) DOCUMENTED: ICD-10-PCS | Mod: CPTII,S$GLB,, | Performed by: INTERNAL MEDICINE

## 2021-03-31 PROCEDURE — 3074F PR MOST RECENT SYSTOLIC BLOOD PRESSURE < 130 MM HG: ICD-10-PCS | Mod: CPTII,S$GLB,, | Performed by: INTERNAL MEDICINE

## 2021-03-31 PROCEDURE — 3008F BODY MASS INDEX DOCD: CPT | Mod: CPTII,S$GLB,, | Performed by: INTERNAL MEDICINE

## 2021-03-31 PROCEDURE — 99396 PR PREVENTIVE VISIT,EST,40-64: ICD-10-PCS | Mod: S$GLB,,, | Performed by: INTERNAL MEDICINE

## 2021-03-31 PROCEDURE — 3074F SYST BP LT 130 MM HG: CPT | Mod: CPTII,S$GLB,, | Performed by: INTERNAL MEDICINE

## 2021-03-31 PROCEDURE — 1126F AMNT PAIN NOTED NONE PRSNT: CPT | Mod: S$GLB,,, | Performed by: INTERNAL MEDICINE

## 2021-03-31 RX ORDER — FERROUS SULFATE 325(65) MG
325 TABLET ORAL DAILY
Qty: 90 TABLET | Refills: 1 | Status: SHIPPED | OUTPATIENT
Start: 2021-03-31 | End: 2021-09-17

## 2021-03-31 RX ORDER — SILDENAFIL 100 MG/1
100 TABLET, FILM COATED ORAL DAILY PRN
Qty: 30 TABLET | Refills: 1 | Status: SHIPPED | OUTPATIENT
Start: 2021-03-31 | End: 2022-01-11 | Stop reason: SDUPTHER

## 2021-04-20 ENCOUNTER — OFFICE VISIT (OUTPATIENT)
Dept: INTERNAL MEDICINE | Facility: CLINIC | Age: 66
End: 2021-04-20
Payer: COMMERCIAL

## 2021-04-20 VITALS
TEMPERATURE: 98 F | HEIGHT: 71 IN | DIASTOLIC BLOOD PRESSURE: 89 MMHG | BODY MASS INDEX: 34.13 KG/M2 | SYSTOLIC BLOOD PRESSURE: 161 MMHG | HEART RATE: 63 BPM | WEIGHT: 243.81 LBS

## 2021-04-20 DIAGNOSIS — E66.09 CLASS 1 OBESITY DUE TO EXCESS CALORIES WITH SERIOUS COMORBIDITY AND BODY MASS INDEX (BMI) OF 33.0 TO 33.9 IN ADULT: ICD-10-CM

## 2021-04-20 DIAGNOSIS — I10 ESSENTIAL (PRIMARY) HYPERTENSION: ICD-10-CM

## 2021-04-20 DIAGNOSIS — M54.41 ACUTE RIGHT-SIDED LOW BACK PAIN WITH RIGHT-SIDED SCIATICA: Primary | ICD-10-CM

## 2021-04-20 DIAGNOSIS — Z98.890 HISTORY OF LUMBAR LAMINECTOMY: ICD-10-CM

## 2021-04-20 PROCEDURE — 99213 PR OFFICE/OUTPT VISIT, EST, LEVL III, 20-29 MIN: ICD-10-PCS | Mod: 25,S$GLB,, | Performed by: INTERNAL MEDICINE

## 2021-04-20 PROCEDURE — 99213 OFFICE O/P EST LOW 20 MIN: CPT | Mod: 25,S$GLB,, | Performed by: INTERNAL MEDICINE

## 2021-04-20 PROCEDURE — 3008F BODY MASS INDEX DOCD: CPT | Mod: CPTII,S$GLB,, | Performed by: INTERNAL MEDICINE

## 2021-04-20 PROCEDURE — 1125F AMNT PAIN NOTED PAIN PRSNT: CPT | Mod: S$GLB,,, | Performed by: INTERNAL MEDICINE

## 2021-04-20 PROCEDURE — 20552 TRIGGER POINT INJECTION: ICD-10-PCS | Mod: S$GLB,,, | Performed by: INTERNAL MEDICINE

## 2021-04-20 PROCEDURE — 20552 NJX 1/MLT TRIGGER POINT 1/2: CPT | Mod: S$GLB,,, | Performed by: INTERNAL MEDICINE

## 2021-04-20 PROCEDURE — 3008F PR BODY MASS INDEX (BMI) DOCUMENTED: ICD-10-PCS | Mod: CPTII,S$GLB,, | Performed by: INTERNAL MEDICINE

## 2021-04-20 PROCEDURE — 1125F PR PAIN SEVERITY QUANTIFIED, PAIN PRESENT: ICD-10-PCS | Mod: S$GLB,,, | Performed by: INTERNAL MEDICINE

## 2021-04-20 RX ORDER — LANOLIN ALCOHOL/MO/W.PET/CERES
500 CREAM (GRAM) TOPICAL EVERY MORNING
COMMUNITY

## 2021-04-20 RX ORDER — TIZANIDINE 4 MG/1
4 TABLET ORAL EVERY 8 HOURS PRN
Qty: 90 TABLET | Refills: 1 | Status: SHIPPED | OUTPATIENT
Start: 2021-04-20 | End: 2021-06-30

## 2021-04-20 RX ORDER — METHYLPREDNISOLONE ACETATE 80 MG/ML
80 INJECTION, SUSPENSION INTRA-ARTICULAR; INTRALESIONAL; INTRAMUSCULAR; SOFT TISSUE
Status: DISCONTINUED | OUTPATIENT
Start: 2021-04-20 | End: 2021-04-20 | Stop reason: HOSPADM

## 2021-04-20 RX ORDER — MELOXICAM 15 MG/1
15 TABLET ORAL DAILY PRN
Qty: 90 TABLET | Refills: 1 | Status: SHIPPED | OUTPATIENT
Start: 2021-04-20 | End: 2021-06-30

## 2021-04-20 RX ADMIN — METHYLPREDNISOLONE ACETATE 80 MG: 80 INJECTION, SUSPENSION INTRA-ARTICULAR; INTRALESIONAL; INTRAMUSCULAR; SOFT TISSUE at 10:04

## 2021-04-22 ENCOUNTER — PATIENT OUTREACH (OUTPATIENT)
Dept: ADMINISTRATIVE | Facility: HOSPITAL | Age: 66
End: 2021-04-22

## 2021-05-04 ENCOUNTER — OFFICE VISIT (OUTPATIENT)
Dept: INTERNAL MEDICINE | Facility: CLINIC | Age: 66
End: 2021-05-04
Payer: COMMERCIAL

## 2021-05-04 VITALS
HEIGHT: 71 IN | HEART RATE: 62 BPM | BODY MASS INDEX: 32.85 KG/M2 | TEMPERATURE: 97 F | SYSTOLIC BLOOD PRESSURE: 90 MMHG | DIASTOLIC BLOOD PRESSURE: 56 MMHG | WEIGHT: 234.69 LBS

## 2021-05-04 DIAGNOSIS — M54.41 ACUTE RIGHT-SIDED LOW BACK PAIN WITH RIGHT-SIDED SCIATICA: Primary | ICD-10-CM

## 2021-05-04 DIAGNOSIS — Z98.890 HISTORY OF LUMBAR LAMINECTOMY FOR SPINAL CORD DECOMPRESSION: ICD-10-CM

## 2021-05-04 DIAGNOSIS — E66.09 CLASS 1 OBESITY DUE TO EXCESS CALORIES WITH SERIOUS COMORBIDITY AND BODY MASS INDEX (BMI) OF 32.0 TO 32.9 IN ADULT: ICD-10-CM

## 2021-05-04 DIAGNOSIS — I10 ESSENTIAL (PRIMARY) HYPERTENSION: ICD-10-CM

## 2021-05-04 PROCEDURE — 3008F BODY MASS INDEX DOCD: CPT | Mod: CPTII,S$GLB,, | Performed by: INTERNAL MEDICINE

## 2021-05-04 PROCEDURE — 1125F AMNT PAIN NOTED PAIN PRSNT: CPT | Mod: S$GLB,,, | Performed by: INTERNAL MEDICINE

## 2021-05-04 PROCEDURE — 3008F PR BODY MASS INDEX (BMI) DOCUMENTED: ICD-10-PCS | Mod: CPTII,S$GLB,, | Performed by: INTERNAL MEDICINE

## 2021-05-04 PROCEDURE — 99213 OFFICE O/P EST LOW 20 MIN: CPT | Mod: S$GLB,,, | Performed by: INTERNAL MEDICINE

## 2021-05-04 PROCEDURE — 1125F PR PAIN SEVERITY QUANTIFIED, PAIN PRESENT: ICD-10-PCS | Mod: S$GLB,,, | Performed by: INTERNAL MEDICINE

## 2021-05-04 PROCEDURE — 99213 PR OFFICE/OUTPT VISIT, EST, LEVL III, 20-29 MIN: ICD-10-PCS | Mod: S$GLB,,, | Performed by: INTERNAL MEDICINE

## 2021-05-04 RX ORDER — DEXAMETHASONE 4 MG/1
4 TABLET ORAL EVERY 12 HOURS
Qty: 14 TABLET | Refills: 0 | Status: SHIPPED | OUTPATIENT
Start: 2021-05-04 | End: 2021-05-11

## 2021-05-05 ENCOUNTER — TELEPHONE (OUTPATIENT)
Dept: FAMILY MEDICINE | Facility: CLINIC | Age: 66
End: 2021-05-05

## 2021-05-05 ENCOUNTER — PATIENT OUTREACH (OUTPATIENT)
Dept: ADMINISTRATIVE | Facility: HOSPITAL | Age: 66
End: 2021-05-05

## 2021-05-21 ENCOUNTER — TELEPHONE (OUTPATIENT)
Dept: NEUROSURGERY | Facility: CLINIC | Age: 66
End: 2021-05-21

## 2021-05-21 ENCOUNTER — HOSPITAL ENCOUNTER (OUTPATIENT)
Dept: RADIOLOGY | Facility: HOSPITAL | Age: 66
Discharge: HOME | End: 2021-05-21
Attending: INTERNAL MEDICINE
Payer: COMMERCIAL

## 2021-05-21 DIAGNOSIS — M54.50 DORSALGIA OF LUMBAR REGION: ICD-10-CM

## 2021-05-21 DIAGNOSIS — Z98.890 HISTORY OF LUMBAR LAMINECTOMY FOR SPINAL CORD DECOMPRESSION: ICD-10-CM

## 2021-05-21 DIAGNOSIS — Z98.890 HISTORY OF LUMBAR LAMINECTOMY FOR SPINAL CORD DECOMPRESSION: Primary | ICD-10-CM

## 2021-05-21 DIAGNOSIS — M54.41 ACUTE RIGHT-SIDED LOW BACK PAIN WITH RIGHT-SIDED SCIATICA: ICD-10-CM

## 2021-05-21 PROCEDURE — 72148 MRI LUMBAR SPINE W/O DYE: CPT | Mod: TC

## 2021-05-21 PROCEDURE — 72148 MRI LUMBAR SPINE WITHOUT CONTRAST: ICD-10-PCS | Mod: 26,,, | Performed by: RADIOLOGY

## 2021-05-21 PROCEDURE — 72148 MRI LUMBAR SPINE W/O DYE: CPT | Mod: 26,,, | Performed by: RADIOLOGY

## 2021-05-27 ENCOUNTER — HOSPITAL ENCOUNTER (OUTPATIENT)
Dept: RADIOLOGY | Facility: HOSPITAL | Age: 66
Discharge: HOME | End: 2021-05-27
Attending: PHYSICIAN ASSISTANT
Payer: COMMERCIAL

## 2021-05-27 ENCOUNTER — OFFICE VISIT (OUTPATIENT)
Dept: NEUROSURGERY | Facility: CLINIC | Age: 66
End: 2021-05-27
Payer: COMMERCIAL

## 2021-05-27 VITALS
RESPIRATION RATE: 15 BRPM | BODY MASS INDEX: 30.75 KG/M2 | DIASTOLIC BLOOD PRESSURE: 81 MMHG | HEIGHT: 72 IN | WEIGHT: 227.06 LBS | SYSTOLIC BLOOD PRESSURE: 140 MMHG | HEART RATE: 68 BPM

## 2021-05-27 DIAGNOSIS — Z98.890 HISTORY OF LUMBAR LAMINECTOMY FOR SPINAL CORD DECOMPRESSION: ICD-10-CM

## 2021-05-27 DIAGNOSIS — M51.26 LUMBAR DISC HERNIATION: Primary | ICD-10-CM

## 2021-05-27 DIAGNOSIS — M54.16 LUMBAR RADICULOPATHY: ICD-10-CM

## 2021-05-27 DIAGNOSIS — M54.50 DORSALGIA OF LUMBAR REGION: ICD-10-CM

## 2021-05-27 PROCEDURE — 99204 PR OFFICE/OUTPT VISIT, NEW, LEVL IV, 45-59 MIN: ICD-10-PCS | Mod: S$GLB,,, | Performed by: NEUROLOGICAL SURGERY

## 2021-05-27 PROCEDURE — 1126F AMNT PAIN NOTED NONE PRSNT: CPT | Mod: S$GLB,,, | Performed by: NEUROLOGICAL SURGERY

## 2021-05-27 PROCEDURE — 99999 PR PBB SHADOW E&M-EST. PATIENT-LVL IV: CPT | Mod: PBBFAC,,, | Performed by: NEUROLOGICAL SURGERY

## 2021-05-27 PROCEDURE — 1126F PR PAIN SEVERITY QUANTIFIED, NO PAIN PRESENT: ICD-10-PCS | Mod: S$GLB,,, | Performed by: NEUROLOGICAL SURGERY

## 2021-05-27 PROCEDURE — 99204 OFFICE O/P NEW MOD 45 MIN: CPT | Mod: S$GLB,,, | Performed by: NEUROLOGICAL SURGERY

## 2021-05-27 PROCEDURE — 72110 XR LUMBAR SPINE AP AND LAT WITH FLEX/EXT: ICD-10-PCS | Mod: 26,,, | Performed by: RADIOLOGY

## 2021-05-27 PROCEDURE — 99999 PR PBB SHADOW E&M-EST. PATIENT-LVL IV: ICD-10-PCS | Mod: PBBFAC,,, | Performed by: NEUROLOGICAL SURGERY

## 2021-05-27 PROCEDURE — 72110 X-RAY EXAM L-2 SPINE 4/>VWS: CPT | Mod: 26,,, | Performed by: RADIOLOGY

## 2021-05-27 PROCEDURE — 72110 X-RAY EXAM L-2 SPINE 4/>VWS: CPT | Mod: TC,FY

## 2021-05-27 PROCEDURE — 3008F PR BODY MASS INDEX (BMI) DOCUMENTED: ICD-10-PCS | Mod: CPTII,S$GLB,, | Performed by: NEUROLOGICAL SURGERY

## 2021-05-27 PROCEDURE — 3008F BODY MASS INDEX DOCD: CPT | Mod: CPTII,S$GLB,, | Performed by: NEUROLOGICAL SURGERY

## 2021-06-18 ENCOUNTER — PATIENT OUTREACH (OUTPATIENT)
Dept: ADMINISTRATIVE | Facility: HOSPITAL | Age: 66
End: 2021-06-18

## 2021-06-26 LAB
ALBUMIN SERPL-MCNC: 4.3 G/DL (ref 3.8–4.8)
ALBUMIN/GLOB SERPL: 1.8 {RATIO} (ref 1.2–2.2)
ALP SERPL-CCNC: 49 IU/L (ref 48–121)
ALT SERPL-CCNC: 19 IU/L (ref 0–44)
AST SERPL-CCNC: 17 IU/L (ref 0–40)
BASOPHILS # BLD AUTO: 0 X10E3/UL (ref 0–0.2)
BASOPHILS NFR BLD AUTO: 1 %
BILIRUB SERPL-MCNC: 0.6 MG/DL (ref 0–1.2)
BUN SERPL-MCNC: 12 MG/DL (ref 8–27)
BUN/CREAT SERPL: 15 (ref 10–24)
CALCIUM SERPL-MCNC: 9.4 MG/DL (ref 8.6–10.2)
CHLORIDE SERPL-SCNC: 104 MMOL/L (ref 96–106)
CHOLEST SERPL-MCNC: 123 MG/DL (ref 100–199)
CO2 SERPL-SCNC: 26 MMOL/L (ref 20–29)
CREAT SERPL-MCNC: 0.8 MG/DL (ref 0.76–1.27)
EOSINOPHIL # BLD AUTO: 0.1 X10E3/UL (ref 0–0.4)
EOSINOPHIL NFR BLD AUTO: 3 %
ERYTHROCYTE [DISTWIDTH] IN BLOOD BY AUTOMATED COUNT: 14.9 % (ref 11.6–15.4)
FERRITIN SERPL-MCNC: 44 NG/ML (ref 30–400)
GLOBULIN SER CALC-MCNC: 2.4 G/DL (ref 1.5–4.5)
GLUCOSE SERPL-MCNC: 109 MG/DL (ref 65–99)
HBA1C MFR BLD: 5.9 % (ref 4.8–5.6)
HCT VFR BLD AUTO: 36.9 % (ref 37.5–51)
HDLC SERPL-MCNC: 44 MG/DL
HGB BLD-MCNC: 12.2 G/DL (ref 13–17.7)
IMM GRANULOCYTES # BLD AUTO: 0 X10E3/UL (ref 0–0.1)
IMM GRANULOCYTES NFR BLD AUTO: 0 %
IMP & REVIEW OF LAB RESULTS: NORMAL
IRON SERPL-MCNC: 85 UG/DL (ref 38–169)
LDLC SERPL CALC-MCNC: 65 MG/DL (ref 0–99)
LYMPHOCYTES # BLD AUTO: 1.8 X10E3/UL (ref 0.7–3.1)
LYMPHOCYTES NFR BLD AUTO: 35 %
MCH RBC QN AUTO: 29 PG (ref 26.6–33)
MCHC RBC AUTO-ENTMCNC: 33.1 G/DL (ref 31.5–35.7)
MCV RBC AUTO: 88 FL (ref 79–97)
MONOCYTES # BLD AUTO: 0.5 X10E3/UL (ref 0.1–0.9)
MONOCYTES NFR BLD AUTO: 9 %
NEUTROPHILS # BLD AUTO: 2.8 X10E3/UL (ref 1.4–7)
NEUTROPHILS NFR BLD AUTO: 52 %
PLATELET # BLD AUTO: 187 X10E3/UL (ref 150–450)
POTASSIUM SERPL-SCNC: 5.3 MMOL/L (ref 3.5–5.2)
PROT SERPL-MCNC: 6.7 G/DL (ref 6–8.5)
PSA FREE MFR SERPL: 46 %
PSA FREE SERPL-MCNC: 0.23 NG/ML
PSA SERPL-MCNC: 0.5 NG/ML (ref 0–4)
RBC # BLD AUTO: 4.21 X10E6/UL (ref 4.14–5.8)
SODIUM SERPL-SCNC: 140 MMOL/L (ref 134–144)
TRIGL SERPL-MCNC: 68 MG/DL (ref 0–149)
TSH SERPL DL<=0.005 MIU/L-ACNC: 0.94 UIU/ML (ref 0.45–4.5)
VLDLC SERPL CALC-MCNC: 14 MG/DL (ref 5–40)
WBC # BLD AUTO: 5.3 X10E3/UL (ref 3.4–10.8)

## 2021-06-30 ENCOUNTER — OFFICE VISIT (OUTPATIENT)
Dept: INTERNAL MEDICINE | Facility: CLINIC | Age: 66
End: 2021-06-30
Payer: COMMERCIAL

## 2021-06-30 VITALS
WEIGHT: 231.25 LBS | HEIGHT: 72 IN | BODY MASS INDEX: 31.32 KG/M2 | SYSTOLIC BLOOD PRESSURE: 129 MMHG | HEART RATE: 64 BPM | DIASTOLIC BLOOD PRESSURE: 74 MMHG | TEMPERATURE: 97 F

## 2021-06-30 DIAGNOSIS — J44.9 CHRONIC OBSTRUCTIVE PULMONARY DISEASE, UNSPECIFIED COPD TYPE: ICD-10-CM

## 2021-06-30 DIAGNOSIS — K21.9 GASTRO-ESOPHAGEAL REFLUX DISEASE WITHOUT ESOPHAGITIS: ICD-10-CM

## 2021-06-30 DIAGNOSIS — I10 ESSENTIAL (PRIMARY) HYPERTENSION: Primary | ICD-10-CM

## 2021-06-30 DIAGNOSIS — E87.5 HYPERKALEMIA: ICD-10-CM

## 2021-06-30 DIAGNOSIS — R73.03 PREDIABETES: ICD-10-CM

## 2021-06-30 DIAGNOSIS — H91.93 BILATERAL HEARING LOSS, UNSPECIFIED HEARING LOSS TYPE: ICD-10-CM

## 2021-06-30 DIAGNOSIS — G89.29 CHRONIC LEFT SHOULDER PAIN: ICD-10-CM

## 2021-06-30 DIAGNOSIS — M51.16 LUMBAR DISC HERNIATION WITH RADICULOPATHY: ICD-10-CM

## 2021-06-30 DIAGNOSIS — G47.33 OBSTRUCTIVE SLEEP APNEA (ADULT) (PEDIATRIC): ICD-10-CM

## 2021-06-30 DIAGNOSIS — Z98.890 HISTORY OF LUMBAR LAMINECTOMY FOR SPINAL CORD DECOMPRESSION: ICD-10-CM

## 2021-06-30 DIAGNOSIS — N52.9 ERECTILE DYSFUNCTION, UNSPECIFIED ERECTILE DYSFUNCTION TYPE: ICD-10-CM

## 2021-06-30 DIAGNOSIS — E66.09 CLASS 1 OBESITY DUE TO EXCESS CALORIES WITH SERIOUS COMORBIDITY AND BODY MASS INDEX (BMI) OF 31.0 TO 31.9 IN ADULT: ICD-10-CM

## 2021-06-30 DIAGNOSIS — D64.9 ANEMIA, UNSPECIFIED TYPE: ICD-10-CM

## 2021-06-30 DIAGNOSIS — M17.0 BILATERAL PRIMARY OSTEOARTHRITIS OF KNEE: ICD-10-CM

## 2021-06-30 DIAGNOSIS — E78.00 PURE HYPERCHOLESTEROLEMIA, UNSPECIFIED: ICD-10-CM

## 2021-06-30 DIAGNOSIS — M25.512 CHRONIC LEFT SHOULDER PAIN: ICD-10-CM

## 2021-06-30 PROCEDURE — 99214 PR OFFICE/OUTPT VISIT, EST, LEVL IV, 30-39 MIN: ICD-10-PCS | Mod: S$GLB,,, | Performed by: INTERNAL MEDICINE

## 2021-06-30 PROCEDURE — 99214 OFFICE O/P EST MOD 30 MIN: CPT | Mod: S$GLB,,, | Performed by: INTERNAL MEDICINE

## 2021-06-30 PROCEDURE — 3078F PR MOST RECENT DIASTOLIC BLOOD PRESSURE < 80 MM HG: ICD-10-PCS | Mod: CPTII,S$GLB,, | Performed by: INTERNAL MEDICINE

## 2021-06-30 PROCEDURE — 3008F PR BODY MASS INDEX (BMI) DOCUMENTED: ICD-10-PCS | Mod: CPTII,S$GLB,, | Performed by: INTERNAL MEDICINE

## 2021-06-30 PROCEDURE — 1125F PR PAIN SEVERITY QUANTIFIED, PAIN PRESENT: ICD-10-PCS | Mod: S$GLB,,, | Performed by: INTERNAL MEDICINE

## 2021-06-30 PROCEDURE — 3074F PR MOST RECENT SYSTOLIC BLOOD PRESSURE < 130 MM HG: ICD-10-PCS | Mod: CPTII,S$GLB,, | Performed by: INTERNAL MEDICINE

## 2021-06-30 PROCEDURE — 3008F BODY MASS INDEX DOCD: CPT | Mod: CPTII,S$GLB,, | Performed by: INTERNAL MEDICINE

## 2021-06-30 PROCEDURE — 3074F SYST BP LT 130 MM HG: CPT | Mod: CPTII,S$GLB,, | Performed by: INTERNAL MEDICINE

## 2021-06-30 PROCEDURE — 3078F DIAST BP <80 MM HG: CPT | Mod: CPTII,S$GLB,, | Performed by: INTERNAL MEDICINE

## 2021-06-30 PROCEDURE — 1125F AMNT PAIN NOTED PAIN PRSNT: CPT | Mod: S$GLB,,, | Performed by: INTERNAL MEDICINE

## 2021-06-30 RX ORDER — CHOLECALCIFEROL (VITAMIN D3) 50 MCG
2000 TABLET ORAL DAILY
Start: 2021-06-30

## 2021-07-16 DIAGNOSIS — M25.512 LEFT SHOULDER PAIN, UNSPECIFIED CHRONICITY: Primary | ICD-10-CM

## 2021-07-19 ENCOUNTER — OFFICE VISIT (OUTPATIENT)
Dept: ORTHOPEDICS | Facility: CLINIC | Age: 66
End: 2021-07-19
Attending: ORTHOPAEDIC SURGERY
Payer: COMMERCIAL

## 2021-07-19 ENCOUNTER — APPOINTMENT (OUTPATIENT)
Dept: RADIOLOGY | Facility: HOSPITAL | Age: 66
End: 2021-07-19
Attending: ORTHOPAEDIC SURGERY
Payer: COMMERCIAL

## 2021-07-19 VITALS
TEMPERATURE: 99 F | HEART RATE: 71 BPM | OXYGEN SATURATION: 96 % | HEIGHT: 72 IN | RESPIRATION RATE: 18 BRPM | DIASTOLIC BLOOD PRESSURE: 72 MMHG | BODY MASS INDEX: 31.03 KG/M2 | SYSTOLIC BLOOD PRESSURE: 132 MMHG | WEIGHT: 229.06 LBS

## 2021-07-19 DIAGNOSIS — G89.29 CHRONIC LEFT SHOULDER PAIN: ICD-10-CM

## 2021-07-19 DIAGNOSIS — M25.512 LEFT SHOULDER PAIN, UNSPECIFIED CHRONICITY: ICD-10-CM

## 2021-07-19 DIAGNOSIS — M25.512 CHRONIC LEFT SHOULDER PAIN: ICD-10-CM

## 2021-07-19 DIAGNOSIS — M25.512 ACUTE PAIN OF LEFT SHOULDER: ICD-10-CM

## 2021-07-19 PROCEDURE — 73030 XR SHOULDER TRAUMA 3 VIEW LEFT: ICD-10-PCS | Mod: 26,LT,, | Performed by: RADIOLOGY

## 2021-07-19 PROCEDURE — 99999 PR PBB SHADOW E&M-EST. PATIENT-LVL V: CPT | Mod: PBBFAC,,, | Performed by: ORTHOPAEDIC SURGERY

## 2021-07-19 PROCEDURE — 99204 PR OFFICE/OUTPT VISIT, NEW, LEVL IV, 45-59 MIN: ICD-10-PCS | Mod: S$GLB,,, | Performed by: ORTHOPAEDIC SURGERY

## 2021-07-19 PROCEDURE — 73030 X-RAY EXAM OF SHOULDER: CPT | Mod: TC,FY,PN,LT

## 2021-07-19 PROCEDURE — 73030 X-RAY EXAM OF SHOULDER: CPT | Mod: 26,LT,, | Performed by: RADIOLOGY

## 2021-07-19 PROCEDURE — 3008F BODY MASS INDEX DOCD: CPT | Mod: CPTII,S$GLB,, | Performed by: ORTHOPAEDIC SURGERY

## 2021-07-19 PROCEDURE — 99999 PR PBB SHADOW E&M-EST. PATIENT-LVL V: ICD-10-PCS | Mod: PBBFAC,,, | Performed by: ORTHOPAEDIC SURGERY

## 2021-07-19 PROCEDURE — 99204 OFFICE O/P NEW MOD 45 MIN: CPT | Mod: S$GLB,,, | Performed by: ORTHOPAEDIC SURGERY

## 2021-07-19 PROCEDURE — 1125F AMNT PAIN NOTED PAIN PRSNT: CPT | Mod: CPTII,S$GLB,, | Performed by: ORTHOPAEDIC SURGERY

## 2021-07-19 PROCEDURE — 3008F PR BODY MASS INDEX (BMI) DOCUMENTED: ICD-10-PCS | Mod: CPTII,S$GLB,, | Performed by: ORTHOPAEDIC SURGERY

## 2021-07-19 PROCEDURE — 1125F PR PAIN SEVERITY QUANTIFIED, PAIN PRESENT: ICD-10-PCS | Mod: CPTII,S$GLB,, | Performed by: ORTHOPAEDIC SURGERY

## 2021-07-19 RX ORDER — DIAZEPAM 5 MG/1
5 TABLET ORAL ONCE
Qty: 1 TABLET | Refills: 0 | Status: SHIPPED | OUTPATIENT
Start: 2021-07-19 | End: 2021-09-15

## 2021-07-27 ENCOUNTER — HOSPITAL ENCOUNTER (OUTPATIENT)
Dept: RADIOLOGY | Facility: HOSPITAL | Age: 66
Discharge: HOME | End: 2021-07-27
Attending: ORTHOPAEDIC SURGERY
Payer: COMMERCIAL

## 2021-07-27 DIAGNOSIS — M25.512 ACUTE PAIN OF LEFT SHOULDER: ICD-10-CM

## 2021-07-27 PROCEDURE — 73221 MRI JOINT UPR EXTREM W/O DYE: CPT | Mod: 26,LT,, | Performed by: RADIOLOGY

## 2021-07-27 PROCEDURE — 73221 MRI JOINT UPR EXTREM W/O DYE: CPT | Mod: TC,LT

## 2021-07-27 PROCEDURE — 73221 MRI SHOULDER WITHOUT CONTRAST LEFT: ICD-10-PCS | Mod: 26,LT,, | Performed by: RADIOLOGY

## 2021-07-29 ENCOUNTER — OFFICE VISIT (OUTPATIENT)
Dept: ORTHOPEDICS | Facility: CLINIC | Age: 66
End: 2021-07-29
Payer: COMMERCIAL

## 2021-07-29 VITALS
HEIGHT: 72 IN | TEMPERATURE: 98 F | OXYGEN SATURATION: 96 % | WEIGHT: 223.56 LBS | BODY MASS INDEX: 30.28 KG/M2 | HEART RATE: 89 BPM | RESPIRATION RATE: 18 BRPM

## 2021-07-29 DIAGNOSIS — M75.122 NONTRAUMATIC COMPLETE TEAR OF LEFT ROTATOR CUFF: Primary | ICD-10-CM

## 2021-07-29 PROCEDURE — 1125F AMNT PAIN NOTED PAIN PRSNT: CPT | Mod: CPTII,S$GLB,, | Performed by: ORTHOPAEDIC SURGERY

## 2021-07-29 PROCEDURE — 1159F MED LIST DOCD IN RCRD: CPT | Mod: CPTII,S$GLB,, | Performed by: ORTHOPAEDIC SURGERY

## 2021-07-29 PROCEDURE — 99213 PR OFFICE/OUTPT VISIT, EST, LEVL III, 20-29 MIN: ICD-10-PCS | Mod: S$GLB,,, | Performed by: ORTHOPAEDIC SURGERY

## 2021-07-29 PROCEDURE — 99213 OFFICE O/P EST LOW 20 MIN: CPT | Mod: S$GLB,,, | Performed by: ORTHOPAEDIC SURGERY

## 2021-07-29 PROCEDURE — 1159F PR MEDICATION LIST DOCUMENTED IN MEDICAL RECORD: ICD-10-PCS | Mod: CPTII,S$GLB,, | Performed by: ORTHOPAEDIC SURGERY

## 2021-07-29 PROCEDURE — 3008F BODY MASS INDEX DOCD: CPT | Mod: CPTII,S$GLB,, | Performed by: ORTHOPAEDIC SURGERY

## 2021-07-29 PROCEDURE — 1160F RVW MEDS BY RX/DR IN RCRD: CPT | Mod: CPTII,S$GLB,, | Performed by: ORTHOPAEDIC SURGERY

## 2021-07-29 PROCEDURE — 99999 PR PBB SHADOW E&M-EST. PATIENT-LVL IV: CPT | Mod: PBBFAC,,, | Performed by: ORTHOPAEDIC SURGERY

## 2021-07-29 PROCEDURE — 1125F PR PAIN SEVERITY QUANTIFIED, PAIN PRESENT: ICD-10-PCS | Mod: CPTII,S$GLB,, | Performed by: ORTHOPAEDIC SURGERY

## 2021-07-29 PROCEDURE — 3008F PR BODY MASS INDEX (BMI) DOCUMENTED: ICD-10-PCS | Mod: CPTII,S$GLB,, | Performed by: ORTHOPAEDIC SURGERY

## 2021-07-29 PROCEDURE — 1160F PR REVIEW ALL MEDS BY PRESCRIBER/CLIN PHARMACIST DOCUMENTED: ICD-10-PCS | Mod: CPTII,S$GLB,, | Performed by: ORTHOPAEDIC SURGERY

## 2021-07-29 PROCEDURE — 99999 PR PBB SHADOW E&M-EST. PATIENT-LVL IV: ICD-10-PCS | Mod: PBBFAC,,, | Performed by: ORTHOPAEDIC SURGERY

## 2021-08-13 ENCOUNTER — TELEPHONE (OUTPATIENT)
Dept: ORTHOPEDICS | Facility: CLINIC | Age: 66
End: 2021-08-13

## 2021-08-25 ENCOUNTER — TELEPHONE (OUTPATIENT)
Dept: ORTHOPEDICS | Facility: CLINIC | Age: 66
End: 2021-08-25

## 2021-09-14 DIAGNOSIS — M75.120 FULL THICKNESS ROTATOR CUFF TEAR: ICD-10-CM

## 2021-09-14 DIAGNOSIS — M75.122 NONTRAUMATIC COMPLETE TEAR OF LEFT ROTATOR CUFF: Primary | ICD-10-CM

## 2021-09-15 ENCOUNTER — OFFICE VISIT (OUTPATIENT)
Dept: INTERNAL MEDICINE | Facility: CLINIC | Age: 66
End: 2021-09-15
Payer: COMMERCIAL

## 2021-09-15 VITALS
DIASTOLIC BLOOD PRESSURE: 76 MMHG | HEART RATE: 66 BPM | BODY MASS INDEX: 30.42 KG/M2 | WEIGHT: 224.63 LBS | HEIGHT: 72 IN | TEMPERATURE: 99 F | SYSTOLIC BLOOD PRESSURE: 133 MMHG

## 2021-09-15 DIAGNOSIS — I10 ESSENTIAL (PRIMARY) HYPERTENSION: Primary | ICD-10-CM

## 2021-09-15 DIAGNOSIS — Z13.220 LIPID SCREENING: ICD-10-CM

## 2021-09-15 DIAGNOSIS — G47.33 OBSTRUCTIVE SLEEP APNEA (ADULT) (PEDIATRIC): ICD-10-CM

## 2021-09-15 DIAGNOSIS — E66.09 CLASS 1 OBESITY DUE TO EXCESS CALORIES WITH SERIOUS COMORBIDITY AND BODY MASS INDEX (BMI) OF 30.0 TO 30.9 IN ADULT: ICD-10-CM

## 2021-09-15 DIAGNOSIS — R73.03 PREDIABETES: ICD-10-CM

## 2021-09-15 DIAGNOSIS — M75.122 COMPLETE TEAR OF LEFT ROTATOR CUFF, UNSPECIFIED WHETHER TRAUMATIC: ICD-10-CM

## 2021-09-15 DIAGNOSIS — M25.512 CHRONIC LEFT SHOULDER PAIN: ICD-10-CM

## 2021-09-15 DIAGNOSIS — G89.29 CHRONIC LEFT SHOULDER PAIN: ICD-10-CM

## 2021-09-15 PROCEDURE — 3075F PR MOST RECENT SYSTOLIC BLOOD PRESS GE 130-139MM HG: ICD-10-PCS | Mod: CPTII,S$GLB,, | Performed by: INTERNAL MEDICINE

## 2021-09-15 PROCEDURE — 4010F PR ACE/ARB THEARPY RXD/TAKEN: ICD-10-PCS | Mod: CPTII,S$GLB,, | Performed by: INTERNAL MEDICINE

## 2021-09-15 PROCEDURE — 99214 PR OFFICE/OUTPT VISIT, EST, LEVL IV, 30-39 MIN: ICD-10-PCS | Mod: S$GLB,,, | Performed by: INTERNAL MEDICINE

## 2021-09-15 PROCEDURE — 99214 OFFICE O/P EST MOD 30 MIN: CPT | Mod: S$GLB,,, | Performed by: INTERNAL MEDICINE

## 2021-09-15 PROCEDURE — 1159F MED LIST DOCD IN RCRD: CPT | Mod: CPTII,S$GLB,, | Performed by: INTERNAL MEDICINE

## 2021-09-15 PROCEDURE — 3075F SYST BP GE 130 - 139MM HG: CPT | Mod: CPTII,S$GLB,, | Performed by: INTERNAL MEDICINE

## 2021-09-15 PROCEDURE — 3008F BODY MASS INDEX DOCD: CPT | Mod: CPTII,S$GLB,, | Performed by: INTERNAL MEDICINE

## 2021-09-15 PROCEDURE — 3008F PR BODY MASS INDEX (BMI) DOCUMENTED: ICD-10-PCS | Mod: CPTII,S$GLB,, | Performed by: INTERNAL MEDICINE

## 2021-09-15 PROCEDURE — 3078F PR MOST RECENT DIASTOLIC BLOOD PRESSURE < 80 MM HG: ICD-10-PCS | Mod: CPTII,S$GLB,, | Performed by: INTERNAL MEDICINE

## 2021-09-15 PROCEDURE — 3078F DIAST BP <80 MM HG: CPT | Mod: CPTII,S$GLB,, | Performed by: INTERNAL MEDICINE

## 2021-09-15 PROCEDURE — 1160F PR REVIEW ALL MEDS BY PRESCRIBER/CLIN PHARMACIST DOCUMENTED: ICD-10-PCS | Mod: CPTII,S$GLB,, | Performed by: INTERNAL MEDICINE

## 2021-09-15 PROCEDURE — 1160F RVW MEDS BY RX/DR IN RCRD: CPT | Mod: CPTII,S$GLB,, | Performed by: INTERNAL MEDICINE

## 2021-09-15 PROCEDURE — 1159F PR MEDICATION LIST DOCUMENTED IN MEDICAL RECORD: ICD-10-PCS | Mod: CPTII,S$GLB,, | Performed by: INTERNAL MEDICINE

## 2021-09-15 PROCEDURE — 4010F ACE/ARB THERAPY RXD/TAKEN: CPT | Mod: CPTII,S$GLB,, | Performed by: INTERNAL MEDICINE

## 2021-09-15 RX ORDER — CHLORTHALIDONE 25 MG/1
25 TABLET ORAL DAILY
COMMUNITY
Start: 2021-08-03 | End: 2022-01-11 | Stop reason: SDUPTHER

## 2021-09-15 RX ORDER — AMLODIPINE BESYLATE 2.5 MG/1
2.5 TABLET ORAL DAILY
COMMUNITY
Start: 2021-07-15

## 2021-09-17 ENCOUNTER — HOSPITAL ENCOUNTER (OUTPATIENT)
Dept: PREADMISSION TESTING | Facility: HOSPITAL | Age: 66
Discharge: HOME | End: 2021-09-17
Attending: ORTHOPAEDIC SURGERY
Payer: COMMERCIAL

## 2021-09-17 ENCOUNTER — ANESTHESIA EVENT (OUTPATIENT)
Dept: SURGERY | Facility: HOSPITAL | Age: 66
End: 2021-09-17
Payer: COMMERCIAL

## 2021-09-17 VITALS
OXYGEN SATURATION: 99 % | DIASTOLIC BLOOD PRESSURE: 75 MMHG | RESPIRATION RATE: 16 BRPM | SYSTOLIC BLOOD PRESSURE: 130 MMHG | TEMPERATURE: 97 F | WEIGHT: 225.5 LBS | BODY MASS INDEX: 30.54 KG/M2 | HEART RATE: 65 BPM | HEIGHT: 72 IN

## 2021-09-17 DIAGNOSIS — Z01.818 PREOP TESTING: Primary | ICD-10-CM

## 2021-09-17 LAB
ALBUMIN SERPL BCP-MCNC: 3.9 G/DL (ref 3.5–5.2)
ALP SERPL-CCNC: 58 U/L (ref 55–135)
ALT SERPL W/O P-5'-P-CCNC: 24 U/L (ref 10–44)
ANION GAP SERPL CALC-SCNC: 9 MMOL/L (ref 8–16)
AST SERPL-CCNC: 20 U/L (ref 10–40)
BASOPHILS # BLD AUTO: 0.02 K/UL (ref 0–0.2)
BASOPHILS NFR BLD: 0.3 % (ref 0–1.9)
BILIRUB SERPL-MCNC: 0.4 MG/DL (ref 0.1–1)
BUN SERPL-MCNC: 20 MG/DL (ref 8–23)
CALCIUM SERPL-MCNC: 10.1 MG/DL (ref 8.7–10.5)
CHLORIDE SERPL-SCNC: 105 MMOL/L (ref 95–110)
CO2 SERPL-SCNC: 26 MMOL/L (ref 23–29)
CREAT SERPL-MCNC: 1 MG/DL (ref 0.5–1.4)
DIFFERENTIAL METHOD: ABNORMAL
EOSINOPHIL # BLD AUTO: 0.3 K/UL (ref 0–0.5)
EOSINOPHIL NFR BLD: 3.2 % (ref 0–8)
ERYTHROCYTE [DISTWIDTH] IN BLOOD BY AUTOMATED COUNT: 13.3 % (ref 11.5–14.5)
EST. GFR  (AFRICAN AMERICAN): >60 ML/MIN/1.73 M^2
EST. GFR  (NON AFRICAN AMERICAN): >60 ML/MIN/1.73 M^2
GLUCOSE SERPL-MCNC: 79 MG/DL (ref 70–110)
HCT VFR BLD AUTO: 40.3 % (ref 40–54)
HGB BLD-MCNC: 13.5 G/DL (ref 14–18)
IMM GRANULOCYTES # BLD AUTO: 0.03 K/UL (ref 0–0.04)
IMM GRANULOCYTES NFR BLD AUTO: 0.4 % (ref 0–0.5)
LYMPHOCYTES # BLD AUTO: 2.1 K/UL (ref 1–4.8)
LYMPHOCYTES NFR BLD: 27.7 % (ref 18–48)
MCH RBC QN AUTO: 29.7 PG (ref 27–31)
MCHC RBC AUTO-ENTMCNC: 33.5 G/DL (ref 32–36)
MCV RBC AUTO: 89 FL (ref 82–98)
MONOCYTES # BLD AUTO: 0.5 K/UL (ref 0.3–1)
MONOCYTES NFR BLD: 7 % (ref 4–15)
NEUTROPHILS # BLD AUTO: 4.8 K/UL (ref 1.8–7.7)
NEUTROPHILS NFR BLD: 61.4 % (ref 38–73)
NRBC BLD-RTO: 0 /100 WBC
PLATELET # BLD AUTO: 199 K/UL (ref 150–450)
PMV BLD AUTO: 11.1 FL (ref 9.2–12.9)
POTASSIUM SERPL-SCNC: 4.6 MMOL/L (ref 3.5–5.1)
PROT SERPL-MCNC: 7.7 G/DL (ref 6–8.4)
RBC # BLD AUTO: 4.55 M/UL (ref 4.6–6.2)
SODIUM SERPL-SCNC: 140 MMOL/L (ref 136–145)
WBC # BLD AUTO: 7.73 K/UL (ref 3.9–12.7)

## 2021-09-17 PROCEDURE — 36415 COLL VENOUS BLD VENIPUNCTURE: CPT | Performed by: ORTHOPAEDIC SURGERY

## 2021-09-17 PROCEDURE — 80053 COMPREHEN METABOLIC PANEL: CPT | Performed by: ORTHOPAEDIC SURGERY

## 2021-09-17 PROCEDURE — 93005 ELECTROCARDIOGRAM TRACING: CPT

## 2021-09-17 PROCEDURE — 93010 EKG 12-LEAD: ICD-10-PCS | Mod: ,,, | Performed by: INTERNAL MEDICINE

## 2021-09-17 PROCEDURE — 85025 COMPLETE CBC W/AUTO DIFF WBC: CPT | Performed by: ORTHOPAEDIC SURGERY

## 2021-09-17 PROCEDURE — 93010 ELECTROCARDIOGRAM REPORT: CPT | Mod: ,,, | Performed by: INTERNAL MEDICINE

## 2021-09-21 ENCOUNTER — HOSPITAL ENCOUNTER (OUTPATIENT)
Dept: PREADMISSION TESTING | Facility: HOSPITAL | Age: 66
Discharge: HOME | End: 2021-09-21
Attending: ORTHOPAEDIC SURGERY
Payer: COMMERCIAL

## 2021-09-21 DIAGNOSIS — Z01.818 PREOP TESTING: ICD-10-CM

## 2021-09-21 LAB
ABO + RH BLD: NORMAL
BLD GP AB SCN CELLS X3 SERPL QL: NORMAL
SARS-COV-2 RDRP RESP QL NAA+PROBE: NEGATIVE

## 2021-09-21 PROCEDURE — U0002 COVID-19 LAB TEST NON-CDC: HCPCS | Performed by: ORTHOPAEDIC SURGERY

## 2021-09-21 PROCEDURE — 36415 COLL VENOUS BLD VENIPUNCTURE: CPT | Performed by: ORTHOPAEDIC SURGERY

## 2021-09-21 PROCEDURE — 86900 BLOOD TYPING SEROLOGIC ABO: CPT | Performed by: ORTHOPAEDIC SURGERY

## 2021-09-22 ENCOUNTER — ANESTHESIA (OUTPATIENT)
Dept: SURGERY | Facility: HOSPITAL | Age: 66
End: 2021-09-22
Payer: COMMERCIAL

## 2021-09-22 ENCOUNTER — HOSPITAL ENCOUNTER (OUTPATIENT)
Facility: HOSPITAL | Age: 66
Discharge: HOME | End: 2021-09-22
Attending: ORTHOPAEDIC SURGERY | Admitting: ORTHOPAEDIC SURGERY
Payer: COMMERCIAL

## 2021-09-22 VITALS
RESPIRATION RATE: 18 BRPM | SYSTOLIC BLOOD PRESSURE: 149 MMHG | BODY MASS INDEX: 30.59 KG/M2 | WEIGHT: 225.5 LBS | DIASTOLIC BLOOD PRESSURE: 67 MMHG | TEMPERATURE: 98 F | OXYGEN SATURATION: 96 % | HEART RATE: 96 BPM

## 2021-09-22 DIAGNOSIS — Z01.818 PREOP TESTING: ICD-10-CM

## 2021-09-22 DIAGNOSIS — M75.122 NONTRAUMATIC COMPLETE TEAR OF LEFT ROTATOR CUFF: Primary | ICD-10-CM

## 2021-09-22 DIAGNOSIS — M75.120 FULL THICKNESS ROTATOR CUFF TEAR: ICD-10-CM

## 2021-09-22 LAB — POCT GLUCOSE: 99 MG/DL (ref 70–110)

## 2021-09-22 PROCEDURE — 64415 PR NERVE BLOCK INJ, ANES/STEROID, BRACHIAL PLEXUS, INCL IMAG GUIDANCE: ICD-10-PCS | Mod: 59,LT,, | Performed by: ANESTHESIOLOGY

## 2021-09-22 PROCEDURE — D9220A PRA ANESTHESIA: ICD-10-PCS | Mod: ,,, | Performed by: NURSE ANESTHETIST, CERTIFIED REGISTERED

## 2021-09-22 PROCEDURE — 63600175 PHARM REV CODE 636 W HCPCS: Performed by: NURSE ANESTHETIST, CERTIFIED REGISTERED

## 2021-09-22 PROCEDURE — 82962 GLUCOSE BLOOD TEST: CPT | Performed by: ORTHOPAEDIC SURGERY

## 2021-09-22 PROCEDURE — 63600175 PHARM REV CODE 636 W HCPCS: Performed by: ANESTHESIOLOGY

## 2021-09-22 PROCEDURE — 71000016 HC POSTOP RECOV ADDL HR: Performed by: ORTHOPAEDIC SURGERY

## 2021-09-22 PROCEDURE — D9220A PRA ANESTHESIA: ICD-10-PCS | Mod: ,,, | Performed by: ANESTHESIOLOGY

## 2021-09-22 PROCEDURE — 36000711: Performed by: ORTHOPAEDIC SURGERY

## 2021-09-22 PROCEDURE — 64415 NJX AA&/STRD BRCH PLXS IMG: CPT | Mod: 59,LT,, | Performed by: ANESTHESIOLOGY

## 2021-09-22 PROCEDURE — 29826 PR SHLDR ARTHROSCOP,PART ACROMIOPLAS: ICD-10-PCS | Mod: LT,,, | Performed by: ORTHOPAEDIC SURGERY

## 2021-09-22 PROCEDURE — 29827 SHO ARTHRS SRG RT8TR CUF RPR: CPT | Mod: LT,,, | Performed by: ORTHOPAEDIC SURGERY

## 2021-09-22 PROCEDURE — 37000009 HC ANESTHESIA EA ADD 15 MINS: Performed by: ORTHOPAEDIC SURGERY

## 2021-09-22 PROCEDURE — 71000033 HC RECOVERY, INTIAL HOUR: Performed by: ORTHOPAEDIC SURGERY

## 2021-09-22 PROCEDURE — C1713 ANCHOR/SCREW BN/BN,TIS/BN: HCPCS | Performed by: ORTHOPAEDIC SURGERY

## 2021-09-22 PROCEDURE — 63600175 PHARM REV CODE 636 W HCPCS: Performed by: ORTHOPAEDIC SURGERY

## 2021-09-22 PROCEDURE — 29826 SHO ARTHRS SRG DECOMPRESSION: CPT | Mod: LT,,, | Performed by: ORTHOPAEDIC SURGERY

## 2021-09-22 PROCEDURE — D9220A PRA ANESTHESIA: Mod: ,,, | Performed by: ANESTHESIOLOGY

## 2021-09-22 PROCEDURE — 23430 REPAIR BICEPS TENDON: CPT | Mod: 51,LT,, | Performed by: ORTHOPAEDIC SURGERY

## 2021-09-22 PROCEDURE — D9220A PRA ANESTHESIA: Mod: ,,, | Performed by: NURSE ANESTHETIST, CERTIFIED REGISTERED

## 2021-09-22 PROCEDURE — 71000015 HC POSTOP RECOV 1ST HR: Performed by: ORTHOPAEDIC SURGERY

## 2021-09-22 PROCEDURE — 36000710: Performed by: ORTHOPAEDIC SURGERY

## 2021-09-22 PROCEDURE — 25000003 PHARM REV CODE 250: Performed by: NURSE ANESTHETIST, CERTIFIED REGISTERED

## 2021-09-22 PROCEDURE — 64415 NJX AA&/STRD BRCH PLXS IMG: CPT | Performed by: ANESTHESIOLOGY

## 2021-09-22 PROCEDURE — 37000008 HC ANESTHESIA 1ST 15 MINUTES: Performed by: ORTHOPAEDIC SURGERY

## 2021-09-22 PROCEDURE — 29827 PR SHLDR ARTHROSCOP,SURG,W/ROTAT CUFF REPR: ICD-10-PCS | Mod: LT,,, | Performed by: ORTHOPAEDIC SURGERY

## 2021-09-22 PROCEDURE — 27201423 OPTIME MED/SURG SUP & DEVICES STERILE SUPPLY: Performed by: ORTHOPAEDIC SURGERY

## 2021-09-22 PROCEDURE — 23430 PR REPAIR BICEPS LONG TENDON: ICD-10-PCS | Mod: 51,LT,, | Performed by: ORTHOPAEDIC SURGERY

## 2021-09-22 PROCEDURE — 71000039 HC RECOVERY, EACH ADD'L HOUR: Performed by: ORTHOPAEDIC SURGERY

## 2021-09-22 DEVICE — IMPLANTABLE DEVICE: Type: IMPLANTABLE DEVICE | Site: SHOULDER | Status: FUNCTIONAL

## 2021-09-22 DEVICE — ANCHOR SWIVELOCK KNTLS BLUE #2: Type: IMPLANTABLE DEVICE | Site: SHOULDER | Status: FUNCTIONAL

## 2021-09-22 DEVICE — SYS IMPL PROXIMAL TENODESIS: Type: IMPLANTABLE DEVICE | Site: SHOULDER | Status: FUNCTIONAL

## 2021-09-22 RX ORDER — CEFAZOLIN SODIUM 2 G/50ML
2 SOLUTION INTRAVENOUS ONCE
Status: COMPLETED | OUTPATIENT
Start: 2021-09-22 | End: 2021-09-22

## 2021-09-22 RX ORDER — SODIUM CHLORIDE 0.9 % (FLUSH) 0.9 %
10 SYRINGE (ML) INJECTION
Status: DISCONTINUED | OUTPATIENT
Start: 2021-09-22 | End: 2021-09-22 | Stop reason: HOSPADM

## 2021-09-22 RX ORDER — SUCCINYLCHOLINE CHLORIDE 20 MG/ML
INJECTION INTRAMUSCULAR; INTRAVENOUS
Status: DISCONTINUED | OUTPATIENT
Start: 2021-09-22 | End: 2021-09-22

## 2021-09-22 RX ORDER — FENTANYL CITRATE 50 UG/ML
25 INJECTION, SOLUTION INTRAMUSCULAR; INTRAVENOUS EVERY 5 MIN PRN
Status: DISCONTINUED | OUTPATIENT
Start: 2021-09-22 | End: 2021-09-22 | Stop reason: HOSPADM

## 2021-09-22 RX ORDER — EPINEPHRINE 0.1 MG/ML
INJECTION INTRAVENOUS
Status: DISCONTINUED | OUTPATIENT
Start: 2021-09-22 | End: 2021-09-22 | Stop reason: HOSPADM

## 2021-09-22 RX ORDER — LIDOCAINE HYDROCHLORIDE 20 MG/ML
INJECTION INTRAVENOUS
Status: DISCONTINUED | OUTPATIENT
Start: 2021-09-22 | End: 2021-09-22

## 2021-09-22 RX ORDER — ROPIVACAINE HYDROCHLORIDE 5 MG/ML
INJECTION, SOLUTION EPIDURAL; INFILTRATION; PERINEURAL
Status: COMPLETED | OUTPATIENT
Start: 2021-09-22 | End: 2021-09-22

## 2021-09-22 RX ORDER — EPHEDRINE SULFATE 50 MG/ML
INJECTION, SOLUTION INTRAVENOUS
Status: DISCONTINUED | OUTPATIENT
Start: 2021-09-22 | End: 2021-09-22

## 2021-09-22 RX ORDER — ONDANSETRON 2 MG/ML
INJECTION INTRAMUSCULAR; INTRAVENOUS
Status: DISCONTINUED | OUTPATIENT
Start: 2021-09-22 | End: 2021-09-22

## 2021-09-22 RX ORDER — KETAMINE HYDROCHLORIDE 100 MG/ML
INJECTION, SOLUTION INTRAMUSCULAR; INTRAVENOUS
Status: DISCONTINUED | OUTPATIENT
Start: 2021-09-22 | End: 2021-09-22

## 2021-09-22 RX ORDER — PROPOFOL 10 MG/ML
VIAL (ML) INTRAVENOUS
Status: DISCONTINUED | OUTPATIENT
Start: 2021-09-22 | End: 2021-09-22

## 2021-09-22 RX ORDER — FENTANYL CITRATE 50 UG/ML
INJECTION, SOLUTION INTRAMUSCULAR; INTRAVENOUS
Status: DISCONTINUED | OUTPATIENT
Start: 2021-09-22 | End: 2021-09-22

## 2021-09-22 RX ORDER — MIDAZOLAM HYDROCHLORIDE 1 MG/ML
INJECTION, SOLUTION INTRAMUSCULAR; INTRAVENOUS
Status: DISCONTINUED | OUTPATIENT
Start: 2021-09-22 | End: 2021-09-22

## 2021-09-22 RX ORDER — ROCURONIUM BROMIDE 10 MG/ML
INJECTION, SOLUTION INTRAVENOUS
Status: DISCONTINUED | OUTPATIENT
Start: 2021-09-22 | End: 2021-09-22

## 2021-09-22 RX ORDER — OXYCODONE AND ACETAMINOPHEN 10; 325 MG/1; MG/1
1 TABLET ORAL EVERY 6 HOURS PRN
Qty: 20 TABLET | Refills: 0 | Status: SHIPPED | OUTPATIENT
Start: 2021-09-22 | End: 2021-12-16

## 2021-09-22 RX ORDER — PHENYLEPHRINE HYDROCHLORIDE 10 MG/ML
INJECTION INTRAVENOUS
Status: DISCONTINUED | OUTPATIENT
Start: 2021-09-22 | End: 2021-09-22

## 2021-09-22 RX ORDER — DEXAMETHASONE SODIUM PHOSPHATE 4 MG/ML
INJECTION, SOLUTION INTRA-ARTICULAR; INTRALESIONAL; INTRAMUSCULAR; INTRAVENOUS; SOFT TISSUE
Status: DISCONTINUED | OUTPATIENT
Start: 2021-09-22 | End: 2021-09-22

## 2021-09-22 RX ORDER — ONDANSETRON 4 MG/1
4 TABLET, ORALLY DISINTEGRATING ORAL EVERY 6 HOURS PRN
Qty: 10 TABLET | Refills: 0 | Status: SHIPPED | OUTPATIENT
Start: 2021-09-22 | End: 2022-01-11

## 2021-09-22 RX ADMIN — SUCCINYLCHOLINE CHLORIDE 160 MG: 20 INJECTION, SOLUTION INTRAMUSCULAR; INTRAVENOUS at 11:09

## 2021-09-22 RX ADMIN — EPHEDRINE SULFATE 5 MG: 50 INJECTION INTRAVENOUS at 11:09

## 2021-09-22 RX ADMIN — FENTANYL CITRATE 25 MCG: 50 INJECTION, SOLUTION INTRAMUSCULAR; INTRAVENOUS at 02:09

## 2021-09-22 RX ADMIN — MIDAZOLAM HYDROCHLORIDE 2 MG: 1 INJECTION, SOLUTION INTRAMUSCULAR; INTRAVENOUS at 10:09

## 2021-09-22 RX ADMIN — GLYCOPYRROLATE 0.1 MG: 0.2 INJECTION, SOLUTION INTRAMUSCULAR; INTRAVITREAL at 11:09

## 2021-09-22 RX ADMIN — LIDOCAINE HYDROCHLORIDE 120 MG: 20 INJECTION, SOLUTION INTRAVENOUS at 02:09

## 2021-09-22 RX ADMIN — KETAMINE HYDROCHLORIDE 20 MG: 100 INJECTION, SOLUTION, CONCENTRATE INTRAMUSCULAR; INTRAVENOUS at 02:09

## 2021-09-22 RX ADMIN — FENTANYL CITRATE 50 MCG: 50 INJECTION, SOLUTION INTRAMUSCULAR; INTRAVENOUS at 10:09

## 2021-09-22 RX ADMIN — ROCURONIUM BROMIDE 10 MG: 10 INJECTION, SOLUTION INTRAVENOUS at 12:09

## 2021-09-22 RX ADMIN — PROPOFOL 150 MG: 10 INJECTION, EMULSION INTRAVENOUS at 11:09

## 2021-09-22 RX ADMIN — PHENYLEPHRINE HYDROCHLORIDE 100 MCG: 10 INJECTION INTRAVENOUS at 11:09

## 2021-09-22 RX ADMIN — ROCURONIUM BROMIDE 10 MG: 10 INJECTION, SOLUTION INTRAVENOUS at 01:09

## 2021-09-22 RX ADMIN — PHENYLEPHRINE HYDROCHLORIDE 200 MCG: 10 INJECTION INTRAVENOUS at 11:09

## 2021-09-22 RX ADMIN — KETAMINE HYDROCHLORIDE 10 MG: 100 INJECTION, SOLUTION, CONCENTRATE INTRAMUSCULAR; INTRAVENOUS at 12:09

## 2021-09-22 RX ADMIN — PHENYLEPHRINE HYDROCHLORIDE 50 MCG: 10 INJECTION INTRAVENOUS at 11:09

## 2021-09-22 RX ADMIN — ROPIVACAINE HYDROCHLORIDE 25 ML: 5 INJECTION, SOLUTION EPIDURAL; INFILTRATION; PERINEURAL at 10:09

## 2021-09-22 RX ADMIN — ROCURONIUM BROMIDE 10 MG: 10 INJECTION, SOLUTION INTRAVENOUS at 11:09

## 2021-09-22 RX ADMIN — FENTANYL CITRATE 25 MCG: 50 INJECTION, SOLUTION INTRAMUSCULAR; INTRAVENOUS at 01:09

## 2021-09-22 RX ADMIN — EPHEDRINE SULFATE 5 MG: 50 INJECTION INTRAVENOUS at 12:09

## 2021-09-22 RX ADMIN — KETAMINE HYDROCHLORIDE 10 MG: 100 INJECTION, SOLUTION, CONCENTRATE INTRAMUSCULAR; INTRAVENOUS at 11:09

## 2021-09-22 RX ADMIN — FENTANYL CITRATE 50 MCG: 50 INJECTION, SOLUTION INTRAMUSCULAR; INTRAVENOUS at 02:09

## 2021-09-22 RX ADMIN — LIDOCAINE HYDROCHLORIDE 100 MG: 20 INJECTION, SOLUTION INTRAVENOUS at 11:09

## 2021-09-22 RX ADMIN — SUGAMMADEX 250 MG: 100 INJECTION, SOLUTION INTRAVENOUS at 02:09

## 2021-09-22 RX ADMIN — ROCURONIUM BROMIDE 40 MG: 10 INJECTION, SOLUTION INTRAVENOUS at 11:09

## 2021-09-22 RX ADMIN — DEXAMETHASONE SODIUM PHOSPHATE 4 MG: 4 INJECTION, SOLUTION INTRAMUSCULAR; INTRAVENOUS at 11:09

## 2021-09-22 RX ADMIN — PROPOFOL 50 MG: 10 INJECTION, EMULSION INTRAVENOUS at 02:09

## 2021-09-22 RX ADMIN — ONDANSETRON 4 MG: 2 INJECTION, SOLUTION INTRAMUSCULAR; INTRAVENOUS at 01:09

## 2021-09-22 RX ADMIN — KETAMINE HYDROCHLORIDE 20 MG: 100 INJECTION, SOLUTION, CONCENTRATE INTRAMUSCULAR; INTRAVENOUS at 11:09

## 2021-09-22 RX ADMIN — CEFAZOLIN SODIUM 2 G: 2 SOLUTION INTRAVENOUS at 11:09

## 2021-10-07 ENCOUNTER — OFFICE VISIT (OUTPATIENT)
Dept: ORTHOPEDICS | Facility: CLINIC | Age: 66
End: 2021-10-07
Payer: COMMERCIAL

## 2021-10-07 VITALS
DIASTOLIC BLOOD PRESSURE: 60 MMHG | RESPIRATION RATE: 18 BRPM | HEART RATE: 71 BPM | SYSTOLIC BLOOD PRESSURE: 118 MMHG | WEIGHT: 227.06 LBS | BODY MASS INDEX: 30.75 KG/M2 | HEIGHT: 72 IN | OXYGEN SATURATION: 97 %

## 2021-10-07 DIAGNOSIS — M75.122 NONTRAUMATIC COMPLETE TEAR OF LEFT ROTATOR CUFF: Primary | ICD-10-CM

## 2021-10-07 PROCEDURE — 1159F MED LIST DOCD IN RCRD: CPT | Mod: CPTII,S$GLB,, | Performed by: ORTHOPAEDIC SURGERY

## 2021-10-07 PROCEDURE — 99024 PR POST-OP FOLLOW-UP VISIT: ICD-10-PCS | Mod: S$GLB,,, | Performed by: ORTHOPAEDIC SURGERY

## 2021-10-07 PROCEDURE — 1159F PR MEDICATION LIST DOCUMENTED IN MEDICAL RECORD: ICD-10-PCS | Mod: CPTII,S$GLB,, | Performed by: ORTHOPAEDIC SURGERY

## 2021-10-07 PROCEDURE — 3008F BODY MASS INDEX DOCD: CPT | Mod: CPTII,S$GLB,, | Performed by: ORTHOPAEDIC SURGERY

## 2021-10-07 PROCEDURE — 99024 POSTOP FOLLOW-UP VISIT: CPT | Mod: S$GLB,,, | Performed by: ORTHOPAEDIC SURGERY

## 2021-10-07 PROCEDURE — 3074F PR MOST RECENT SYSTOLIC BLOOD PRESSURE < 130 MM HG: ICD-10-PCS | Mod: CPTII,S$GLB,, | Performed by: ORTHOPAEDIC SURGERY

## 2021-10-07 PROCEDURE — 3008F PR BODY MASS INDEX (BMI) DOCUMENTED: ICD-10-PCS | Mod: CPTII,S$GLB,, | Performed by: ORTHOPAEDIC SURGERY

## 2021-10-07 PROCEDURE — 4010F ACE/ARB THERAPY RXD/TAKEN: CPT | Mod: CPTII,S$GLB,, | Performed by: ORTHOPAEDIC SURGERY

## 2021-10-07 PROCEDURE — 99999 PR PBB SHADOW E&M-EST. PATIENT-LVL IV: CPT | Mod: PBBFAC,,, | Performed by: ORTHOPAEDIC SURGERY

## 2021-10-07 PROCEDURE — 4010F PR ACE/ARB THEARPY RXD/TAKEN: ICD-10-PCS | Mod: CPTII,S$GLB,, | Performed by: ORTHOPAEDIC SURGERY

## 2021-10-07 PROCEDURE — 1160F RVW MEDS BY RX/DR IN RCRD: CPT | Mod: CPTII,S$GLB,, | Performed by: ORTHOPAEDIC SURGERY

## 2021-10-07 PROCEDURE — 1160F PR REVIEW ALL MEDS BY PRESCRIBER/CLIN PHARMACIST DOCUMENTED: ICD-10-PCS | Mod: CPTII,S$GLB,, | Performed by: ORTHOPAEDIC SURGERY

## 2021-10-07 PROCEDURE — 3078F DIAST BP <80 MM HG: CPT | Mod: CPTII,S$GLB,, | Performed by: ORTHOPAEDIC SURGERY

## 2021-10-07 PROCEDURE — 3074F SYST BP LT 130 MM HG: CPT | Mod: CPTII,S$GLB,, | Performed by: ORTHOPAEDIC SURGERY

## 2021-10-07 PROCEDURE — 99999 PR PBB SHADOW E&M-EST. PATIENT-LVL IV: ICD-10-PCS | Mod: PBBFAC,,, | Performed by: ORTHOPAEDIC SURGERY

## 2021-10-07 PROCEDURE — 3078F PR MOST RECENT DIASTOLIC BLOOD PRESSURE < 80 MM HG: ICD-10-PCS | Mod: CPTII,S$GLB,, | Performed by: ORTHOPAEDIC SURGERY

## 2021-11-04 ENCOUNTER — OFFICE VISIT (OUTPATIENT)
Dept: ORTHOPEDICS | Facility: CLINIC | Age: 66
End: 2021-11-04
Payer: COMMERCIAL

## 2021-11-04 VITALS
HEIGHT: 72 IN | BODY MASS INDEX: 31.3 KG/M2 | WEIGHT: 231.06 LBS | DIASTOLIC BLOOD PRESSURE: 74 MMHG | HEART RATE: 64 BPM | RESPIRATION RATE: 18 BRPM | SYSTOLIC BLOOD PRESSURE: 124 MMHG

## 2021-11-04 DIAGNOSIS — M75.122 NONTRAUMATIC COMPLETE TEAR OF LEFT ROTATOR CUFF: Primary | ICD-10-CM

## 2021-11-04 PROCEDURE — 1160F PR REVIEW ALL MEDS BY PRESCRIBER/CLIN PHARMACIST DOCUMENTED: ICD-10-PCS | Mod: CPTII,S$GLB,, | Performed by: ORTHOPAEDIC SURGERY

## 2021-11-04 PROCEDURE — 99024 POSTOP FOLLOW-UP VISIT: CPT | Mod: S$GLB,,, | Performed by: ORTHOPAEDIC SURGERY

## 2021-11-04 PROCEDURE — 3078F DIAST BP <80 MM HG: CPT | Mod: CPTII,S$GLB,, | Performed by: ORTHOPAEDIC SURGERY

## 2021-11-04 PROCEDURE — 3074F SYST BP LT 130 MM HG: CPT | Mod: CPTII,S$GLB,, | Performed by: ORTHOPAEDIC SURGERY

## 2021-11-04 PROCEDURE — 4010F PR ACE/ARB THEARPY RXD/TAKEN: ICD-10-PCS | Mod: CPTII,S$GLB,, | Performed by: ORTHOPAEDIC SURGERY

## 2021-11-04 PROCEDURE — 3078F PR MOST RECENT DIASTOLIC BLOOD PRESSURE < 80 MM HG: ICD-10-PCS | Mod: CPTII,S$GLB,, | Performed by: ORTHOPAEDIC SURGERY

## 2021-11-04 PROCEDURE — 4010F ACE/ARB THERAPY RXD/TAKEN: CPT | Mod: CPTII,S$GLB,, | Performed by: ORTHOPAEDIC SURGERY

## 2021-11-04 PROCEDURE — 1160F RVW MEDS BY RX/DR IN RCRD: CPT | Mod: CPTII,S$GLB,, | Performed by: ORTHOPAEDIC SURGERY

## 2021-11-04 PROCEDURE — 99999 PR PBB SHADOW E&M-EST. PATIENT-LVL IV: ICD-10-PCS | Mod: PBBFAC,,, | Performed by: ORTHOPAEDIC SURGERY

## 2021-11-04 PROCEDURE — 3074F PR MOST RECENT SYSTOLIC BLOOD PRESSURE < 130 MM HG: ICD-10-PCS | Mod: CPTII,S$GLB,, | Performed by: ORTHOPAEDIC SURGERY

## 2021-11-04 PROCEDURE — 3008F PR BODY MASS INDEX (BMI) DOCUMENTED: ICD-10-PCS | Mod: CPTII,S$GLB,, | Performed by: ORTHOPAEDIC SURGERY

## 2021-11-04 PROCEDURE — 1159F MED LIST DOCD IN RCRD: CPT | Mod: CPTII,S$GLB,, | Performed by: ORTHOPAEDIC SURGERY

## 2021-11-04 PROCEDURE — 99999 PR PBB SHADOW E&M-EST. PATIENT-LVL IV: CPT | Mod: PBBFAC,,, | Performed by: ORTHOPAEDIC SURGERY

## 2021-11-04 PROCEDURE — 99024 PR POST-OP FOLLOW-UP VISIT: ICD-10-PCS | Mod: S$GLB,,, | Performed by: ORTHOPAEDIC SURGERY

## 2021-11-04 PROCEDURE — 1159F PR MEDICATION LIST DOCUMENTED IN MEDICAL RECORD: ICD-10-PCS | Mod: CPTII,S$GLB,, | Performed by: ORTHOPAEDIC SURGERY

## 2021-11-04 PROCEDURE — 3008F BODY MASS INDEX DOCD: CPT | Mod: CPTII,S$GLB,, | Performed by: ORTHOPAEDIC SURGERY

## 2021-11-05 ENCOUNTER — CLINICAL SUPPORT (OUTPATIENT)
Dept: REHABILITATION | Facility: HOSPITAL | Age: 66
End: 2021-11-05
Attending: ORTHOPAEDIC SURGERY
Payer: COMMERCIAL

## 2021-11-05 DIAGNOSIS — M25.512 ACUTE PAIN OF LEFT SHOULDER: ICD-10-CM

## 2021-11-05 DIAGNOSIS — Z98.890 S/P SHOULDER SURGERY: ICD-10-CM

## 2021-11-05 DIAGNOSIS — R29.898 SHOULDER WEAKNESS: ICD-10-CM

## 2021-11-05 DIAGNOSIS — M75.122 NONTRAUMATIC COMPLETE TEAR OF LEFT ROTATOR CUFF: ICD-10-CM

## 2021-11-05 DIAGNOSIS — M25.612 DECREASED RANGE OF MOTION OF LEFT SHOULDER: ICD-10-CM

## 2021-11-05 PROCEDURE — 97161 PT EVAL LOW COMPLEX 20 MIN: CPT | Mod: PN

## 2021-11-05 PROCEDURE — 97110 THERAPEUTIC EXERCISES: CPT | Mod: PN

## 2021-11-09 ENCOUNTER — CLINICAL SUPPORT (OUTPATIENT)
Dept: REHABILITATION | Facility: HOSPITAL | Age: 66
End: 2021-11-09
Attending: ORTHOPAEDIC SURGERY
Payer: COMMERCIAL

## 2021-11-09 DIAGNOSIS — M25.512 ACUTE PAIN OF LEFT SHOULDER: ICD-10-CM

## 2021-11-09 DIAGNOSIS — R29.898 SHOULDER WEAKNESS: ICD-10-CM

## 2021-11-09 DIAGNOSIS — Z98.890 S/P SHOULDER SURGERY: ICD-10-CM

## 2021-11-09 DIAGNOSIS — M25.612 DECREASED RANGE OF MOTION OF LEFT SHOULDER: ICD-10-CM

## 2021-11-09 PROCEDURE — 97110 THERAPEUTIC EXERCISES: CPT | Mod: PN

## 2021-11-11 ENCOUNTER — CLINICAL SUPPORT (OUTPATIENT)
Dept: REHABILITATION | Facility: HOSPITAL | Age: 66
End: 2021-11-11
Attending: ORTHOPAEDIC SURGERY
Payer: COMMERCIAL

## 2021-11-11 DIAGNOSIS — Z98.890 S/P SHOULDER SURGERY: ICD-10-CM

## 2021-11-11 DIAGNOSIS — M25.512 ACUTE PAIN OF LEFT SHOULDER: ICD-10-CM

## 2021-11-11 DIAGNOSIS — R29.898 SHOULDER WEAKNESS: ICD-10-CM

## 2021-11-11 DIAGNOSIS — M25.612 DECREASED RANGE OF MOTION OF LEFT SHOULDER: ICD-10-CM

## 2021-11-11 PROCEDURE — 97110 THERAPEUTIC EXERCISES: CPT | Mod: PN

## 2021-11-11 PROCEDURE — 97140 MANUAL THERAPY 1/> REGIONS: CPT | Mod: PN

## 2021-11-15 ENCOUNTER — CLINICAL SUPPORT (OUTPATIENT)
Dept: REHABILITATION | Facility: HOSPITAL | Age: 66
End: 2021-11-15
Attending: ORTHOPAEDIC SURGERY
Payer: COMMERCIAL

## 2021-11-15 DIAGNOSIS — M25.612 DECREASED RANGE OF MOTION OF LEFT SHOULDER: ICD-10-CM

## 2021-11-15 DIAGNOSIS — Z98.890 S/P SHOULDER SURGERY: ICD-10-CM

## 2021-11-15 DIAGNOSIS — M25.512 ACUTE PAIN OF LEFT SHOULDER: ICD-10-CM

## 2021-11-15 DIAGNOSIS — R29.898 SHOULDER WEAKNESS: ICD-10-CM

## 2021-11-15 PROCEDURE — 97140 MANUAL THERAPY 1/> REGIONS: CPT | Mod: PN

## 2021-11-15 PROCEDURE — 97110 THERAPEUTIC EXERCISES: CPT | Mod: PN

## 2021-11-19 ENCOUNTER — CLINICAL SUPPORT (OUTPATIENT)
Dept: REHABILITATION | Facility: HOSPITAL | Age: 66
End: 2021-11-19
Attending: ORTHOPAEDIC SURGERY
Payer: COMMERCIAL

## 2021-11-19 DIAGNOSIS — M25.512 ACUTE PAIN OF LEFT SHOULDER: ICD-10-CM

## 2021-11-19 DIAGNOSIS — M25.612 DECREASED RANGE OF MOTION OF LEFT SHOULDER: ICD-10-CM

## 2021-11-19 DIAGNOSIS — R29.898 SHOULDER WEAKNESS: ICD-10-CM

## 2021-11-19 DIAGNOSIS — Z98.890 S/P SHOULDER SURGERY: ICD-10-CM

## 2021-11-19 PROCEDURE — 97110 THERAPEUTIC EXERCISES: CPT | Mod: PN

## 2021-11-30 ENCOUNTER — CLINICAL SUPPORT (OUTPATIENT)
Dept: REHABILITATION | Facility: HOSPITAL | Age: 66
End: 2021-11-30
Attending: ORTHOPAEDIC SURGERY
Payer: COMMERCIAL

## 2021-11-30 DIAGNOSIS — M25.612 DECREASED RANGE OF MOTION OF LEFT SHOULDER: ICD-10-CM

## 2021-11-30 DIAGNOSIS — Z98.890 S/P SHOULDER SURGERY: ICD-10-CM

## 2021-11-30 DIAGNOSIS — R29.898 SHOULDER WEAKNESS: ICD-10-CM

## 2021-11-30 DIAGNOSIS — M25.512 ACUTE PAIN OF LEFT SHOULDER: ICD-10-CM

## 2021-11-30 PROCEDURE — 97110 THERAPEUTIC EXERCISES: CPT | Mod: PN

## 2021-12-02 ENCOUNTER — CLINICAL SUPPORT (OUTPATIENT)
Dept: REHABILITATION | Facility: HOSPITAL | Age: 66
End: 2021-12-02
Attending: ORTHOPAEDIC SURGERY
Payer: MEDICARE

## 2021-12-02 DIAGNOSIS — Z98.890 S/P SHOULDER SURGERY: ICD-10-CM

## 2021-12-02 DIAGNOSIS — M25.612 DECREASED RANGE OF MOTION OF LEFT SHOULDER: ICD-10-CM

## 2021-12-02 DIAGNOSIS — R29.898 SHOULDER WEAKNESS: ICD-10-CM

## 2021-12-02 DIAGNOSIS — M25.512 ACUTE PAIN OF LEFT SHOULDER: ICD-10-CM

## 2021-12-02 PROCEDURE — 97110 THERAPEUTIC EXERCISES: CPT | Mod: PN

## 2021-12-07 ENCOUNTER — CLINICAL SUPPORT (OUTPATIENT)
Dept: REHABILITATION | Facility: HOSPITAL | Age: 66
End: 2021-12-07
Attending: ORTHOPAEDIC SURGERY
Payer: MEDICARE

## 2021-12-07 DIAGNOSIS — M25.612 DECREASED RANGE OF MOTION OF LEFT SHOULDER: ICD-10-CM

## 2021-12-07 DIAGNOSIS — Z98.890 S/P SHOULDER SURGERY: ICD-10-CM

## 2021-12-07 DIAGNOSIS — R29.898 SHOULDER WEAKNESS: ICD-10-CM

## 2021-12-07 DIAGNOSIS — M25.512 ACUTE PAIN OF LEFT SHOULDER: ICD-10-CM

## 2021-12-07 PROCEDURE — 97110 THERAPEUTIC EXERCISES: CPT | Mod: PN

## 2021-12-09 ENCOUNTER — CLINICAL SUPPORT (OUTPATIENT)
Dept: REHABILITATION | Facility: HOSPITAL | Age: 66
End: 2021-12-09
Attending: ORTHOPAEDIC SURGERY
Payer: MEDICARE

## 2021-12-09 DIAGNOSIS — R29.898 SHOULDER WEAKNESS: ICD-10-CM

## 2021-12-09 DIAGNOSIS — M25.612 DECREASED RANGE OF MOTION OF LEFT SHOULDER: ICD-10-CM

## 2021-12-09 DIAGNOSIS — M25.512 ACUTE PAIN OF LEFT SHOULDER: ICD-10-CM

## 2021-12-09 DIAGNOSIS — Z98.890 S/P SHOULDER SURGERY: ICD-10-CM

## 2021-12-09 PROCEDURE — 97110 THERAPEUTIC EXERCISES: CPT | Mod: PN

## 2021-12-14 ENCOUNTER — CLINICAL SUPPORT (OUTPATIENT)
Dept: REHABILITATION | Facility: HOSPITAL | Age: 66
End: 2021-12-14
Attending: ORTHOPAEDIC SURGERY
Payer: MEDICARE

## 2021-12-14 DIAGNOSIS — R29.898 SHOULDER WEAKNESS: ICD-10-CM

## 2021-12-14 DIAGNOSIS — Z98.890 S/P SHOULDER SURGERY: ICD-10-CM

## 2021-12-14 DIAGNOSIS — M25.512 ACUTE PAIN OF LEFT SHOULDER: ICD-10-CM

## 2021-12-14 DIAGNOSIS — M25.612 DECREASED RANGE OF MOTION OF LEFT SHOULDER: ICD-10-CM

## 2021-12-14 PROCEDURE — 97110 THERAPEUTIC EXERCISES: CPT | Mod: PN

## 2021-12-16 ENCOUNTER — OFFICE VISIT (OUTPATIENT)
Dept: ORTHOPEDICS | Facility: CLINIC | Age: 66
End: 2021-12-16
Payer: COMMERCIAL

## 2021-12-16 VITALS
SYSTOLIC BLOOD PRESSURE: 130 MMHG | BODY MASS INDEX: 32.49 KG/M2 | HEART RATE: 65 BPM | RESPIRATION RATE: 18 BRPM | HEIGHT: 72 IN | WEIGHT: 239.88 LBS | DIASTOLIC BLOOD PRESSURE: 70 MMHG | OXYGEN SATURATION: 98 %

## 2021-12-16 DIAGNOSIS — M75.122 NONTRAUMATIC COMPLETE TEAR OF LEFT ROTATOR CUFF: Primary | ICD-10-CM

## 2021-12-16 PROCEDURE — 99024 PR POST-OP FOLLOW-UP VISIT: ICD-10-PCS | Mod: S$GLB,,, | Performed by: ORTHOPAEDIC SURGERY

## 2021-12-16 PROCEDURE — 99999 PR PBB SHADOW E&M-EST. PATIENT-LVL IV: ICD-10-PCS | Mod: PBBFAC,,, | Performed by: ORTHOPAEDIC SURGERY

## 2021-12-16 PROCEDURE — 4010F ACE/ARB THERAPY RXD/TAKEN: CPT | Mod: CPTII,S$GLB,, | Performed by: ORTHOPAEDIC SURGERY

## 2021-12-16 PROCEDURE — 99024 POSTOP FOLLOW-UP VISIT: CPT | Mod: S$GLB,,, | Performed by: ORTHOPAEDIC SURGERY

## 2021-12-16 PROCEDURE — 4010F PR ACE/ARB THEARPY RXD/TAKEN: ICD-10-PCS | Mod: CPTII,S$GLB,, | Performed by: ORTHOPAEDIC SURGERY

## 2021-12-16 PROCEDURE — 99999 PR PBB SHADOW E&M-EST. PATIENT-LVL IV: CPT | Mod: PBBFAC,,, | Performed by: ORTHOPAEDIC SURGERY

## 2021-12-21 ENCOUNTER — CLINICAL SUPPORT (OUTPATIENT)
Dept: REHABILITATION | Facility: HOSPITAL | Age: 66
End: 2021-12-21
Attending: ORTHOPAEDIC SURGERY
Payer: MEDICARE

## 2021-12-21 DIAGNOSIS — Z98.890 S/P SHOULDER SURGERY: ICD-10-CM

## 2021-12-21 DIAGNOSIS — M25.612 DECREASED RANGE OF MOTION OF LEFT SHOULDER: ICD-10-CM

## 2021-12-21 DIAGNOSIS — R29.898 SHOULDER WEAKNESS: ICD-10-CM

## 2021-12-21 DIAGNOSIS — M25.512 ACUTE PAIN OF LEFT SHOULDER: ICD-10-CM

## 2021-12-21 PROCEDURE — 97110 THERAPEUTIC EXERCISES: CPT | Mod: PN

## 2021-12-22 ENCOUNTER — CLINICAL SUPPORT (OUTPATIENT)
Dept: REHABILITATION | Facility: HOSPITAL | Age: 66
End: 2021-12-22
Attending: ORTHOPAEDIC SURGERY
Payer: MEDICARE

## 2021-12-22 DIAGNOSIS — R29.898 SHOULDER WEAKNESS: ICD-10-CM

## 2021-12-22 DIAGNOSIS — M25.512 ACUTE PAIN OF LEFT SHOULDER: ICD-10-CM

## 2021-12-22 DIAGNOSIS — Z98.890 S/P SHOULDER SURGERY: ICD-10-CM

## 2021-12-22 DIAGNOSIS — M25.612 DECREASED RANGE OF MOTION OF LEFT SHOULDER: ICD-10-CM

## 2021-12-22 PROCEDURE — 97110 THERAPEUTIC EXERCISES: CPT | Mod: PN,CQ

## 2021-12-28 ENCOUNTER — CLINICAL SUPPORT (OUTPATIENT)
Dept: REHABILITATION | Facility: HOSPITAL | Age: 66
End: 2021-12-28
Attending: ORTHOPAEDIC SURGERY
Payer: MEDICARE

## 2021-12-28 DIAGNOSIS — M25.512 ACUTE PAIN OF LEFT SHOULDER: ICD-10-CM

## 2021-12-28 DIAGNOSIS — R29.898 SHOULDER WEAKNESS: ICD-10-CM

## 2021-12-28 DIAGNOSIS — Z98.890 S/P SHOULDER SURGERY: ICD-10-CM

## 2021-12-28 DIAGNOSIS — M25.612 DECREASED RANGE OF MOTION OF LEFT SHOULDER: ICD-10-CM

## 2021-12-28 PROCEDURE — 97110 THERAPEUTIC EXERCISES: CPT | Mod: PN

## 2021-12-30 ENCOUNTER — CLINICAL SUPPORT (OUTPATIENT)
Dept: REHABILITATION | Facility: HOSPITAL | Age: 66
End: 2021-12-30
Attending: ORTHOPAEDIC SURGERY
Payer: MEDICARE

## 2021-12-30 DIAGNOSIS — M25.612 DECREASED RANGE OF MOTION OF LEFT SHOULDER: ICD-10-CM

## 2021-12-30 DIAGNOSIS — M25.512 ACUTE PAIN OF LEFT SHOULDER: ICD-10-CM

## 2021-12-30 DIAGNOSIS — R29.898 SHOULDER WEAKNESS: ICD-10-CM

## 2021-12-30 DIAGNOSIS — Z98.890 S/P SHOULDER SURGERY: ICD-10-CM

## 2021-12-30 PROCEDURE — 97110 THERAPEUTIC EXERCISES: CPT | Mod: PN,CQ

## 2022-01-04 ENCOUNTER — CLINICAL SUPPORT (OUTPATIENT)
Dept: REHABILITATION | Facility: HOSPITAL | Age: 67
End: 2022-01-04
Attending: ORTHOPAEDIC SURGERY
Payer: MEDICARE

## 2022-01-04 DIAGNOSIS — R29.898 SHOULDER WEAKNESS: ICD-10-CM

## 2022-01-04 DIAGNOSIS — Z98.890 S/P SHOULDER SURGERY: ICD-10-CM

## 2022-01-04 DIAGNOSIS — M25.612 DECREASED RANGE OF MOTION OF LEFT SHOULDER: ICD-10-CM

## 2022-01-04 DIAGNOSIS — M25.512 ACUTE PAIN OF LEFT SHOULDER: ICD-10-CM

## 2022-01-04 PROCEDURE — 97110 THERAPEUTIC EXERCISES: CPT | Mod: PN

## 2022-01-04 NOTE — PROGRESS NOTES
[facility]   Physical Therapy Treatment Note     Name: Markell Keys  Clinic Number: [id number]    Therapy Diagnosis:   Encounter Diagnoses   Name Primary?    S/P shoulder surgery     Decreased range of motion of left shoulder     Shoulder weakness     Acute pain of left shoulder      Physician: Carmencita Ramirez MD    Visit Date: 1/4/2022    Physician: Carmencita Ramirez MD     Physician Orders: PT Eval and Treat   Medical Diagnosis from Referral: Nontraumatic complete tear of left rotator cuff  Evaluation Date: 11/5/2021  Authorization Period Expiration: 12/31/2021  Plan of Care Expiration: 7-5-22  Visit # / Visits authorized: 16/20   Progress Note Due: 1-  FOTO: 1/1  PTA Visit #: 1/5     DOS: 9-     POW: 14 weeks 6 day ( as of 1-4-2021)   Precautions:   POSTOPERATIVE PLAN: Plan to follow subscapularis-supraspinatus repair protocol (>3 cm in total size). Passive and active assisted motion at 6 weeks. No biceps resistive activity x 8 weeks. No cuff resistive activity x 12 weeks.    Time In: 7:45 am  Time Out: 8:30  am  Total Billable Time: 15  minutes      SUBJECTIVE     Pt reports: Pt has no new complaints and denies shoulder pain currently.   He was compliant with home exercise program.  Response to previous treatment: good; no pain or soreness  Functional change: No change    Pain: 0/10  Location: left shoulder      OBJECTIVE     Objective Measures updated at progress report unless specified.       TREATMENT       Markell received the treatments listed below:      received therapeutic exercises to develop strength and ROM for 45  minutes including:    UBE 3/3 lvl 3  Standing scapular retraction on freemotion 7# 3x10   Standing shoulder extension on freemotion 7# 3x10  Standing shoulder ER RTB 30x  Standing shoulder IR +GTB 30x   +sustained scaption  20x  Ball walll (green ball) 3x30 sec CW and CCW  + wall clock 10 reps   Matrix rows 40# 30x   SL shoulder ER +2#  3x10  SL shoulder flexion  3x10  SL shoulder abd  +2# 3x10  Prone shoulder extension 3# 3x10  Prone rows 3#  2x10   Prone T's 3x10  supine shoulder flexion with wand 3# 30x  Supine serratus punch 3# wand 3x10   Body blade at 0 deg flexion/abd 3x30 sec          received the following manual therapy techniques: Soft tissue Mobilization were applied to the: L shoulder  For 0 minutes, including:  PROM shoulder abd and ER  Shoulder oscillation   Joint mobs grade I    PATIENT EDUCATION AND HOME EXERCISES     Home Exercises Provided and Patient Education Provided     Education provided: .       Written Home Exercises Provided: Patient instructed to cont prior HEP. Exercises were reviewed and Markell was able to demonstrate them prior to the end of the session.  Markell demonstrated good  understanding of the education provided. See EMR under Patient Instructions for exercises provided during therapy sessions    ASSESSMENT     Pt is about 14 weeks and 6 days since surgery. Pt tolerated well progression of exercises. Addition of wall clock and body blades to strengthening jose-scapular muscles. Pt with minor L shoulder pain during shoulder flexion from 60 to 120 deg, but pain subsided once he passes that range. Pt cont with L rotator cuff weakness. Pt required mod v/c's to perform exercises with proper form. Plan to cont skilled PT services according to protocol.     Markell is progressing well towards his goals.   Pt prognosis is Excellent.     Pt will continue to benefit from skilled outpatient physical therapy to address the deficits listed in the problem list box on initial evaluation, provide pt/family education and to maximize pt's level of independence in the home and community environment.     Pt's spiritual, cultural and educational needs considered and pt agreeable to plan of care and goals.     Anticipated barriers to physical therapy: Transportation     GOALS: Short Term Goals: 4 weeks  1.Report decreased in pain at worse  less than  <   / =  5  /10  to increase tolerance for functional mobility. Goal met 12-  2. Pt to improve L shoulder PROM to 90 deg flexion to allow for improved functional mobility to allow for improvement in IADLs. Goal met 12-  3. Pt will be out of sling with proper gait pattern to improve community ambulation. Goal met 12-  4. Pt to report be conscious of impaired sitting and standing posture daily to decrease pain. Goal met 12-  5. Pt to tolerate HEP to improve ROM and independence with ADL's. Goal met 12-     Long Term Goals: 28 weeks  1.Report decreased in pain at worse less than  <   / =  1 /10  to increase tolerance for functional mobility. On going  2.  Patient will demonstrate improved overall function per FOTO Survey to CI = at least 1% but < 20% impaired, limited or restricted score or less.On going  3.Increased L shoulder  MMT 1 grade to increase tolerance for ADL and work activities.On going  4. Pt to report and demonstrate proper posture in standing and sitting to decrease pain. On going  5. Pt to be Independent with HEP to improve ROM and independence with ADL's.On going  6. Pt to improve  L shoulder active range of motion to 160 to 170 deg in flexion and abd, 60 to 70 deg ER and 70 deg of IR to allow for improved functional mobility to allow for improvement in IADLs. On going    PLAN     Plan of care Certification: 11/5/2021 to 7-5-22    Armando García, PT

## 2022-01-05 LAB
ALBUMIN SERPL-MCNC: 4.6 G/DL (ref 3.8–4.8)
ALBUMIN/GLOB SERPL: 1.6 {RATIO} (ref 1.2–2.2)
ALP SERPL-CCNC: 66 IU/L (ref 44–121)
ALT SERPL-CCNC: 24 IU/L (ref 0–44)
AST SERPL-CCNC: 20 IU/L (ref 0–40)
BILIRUB SERPL-MCNC: 0.5 MG/DL (ref 0–1.2)
BUN SERPL-MCNC: 16 MG/DL (ref 8–27)
BUN/CREAT SERPL: 16 (ref 10–24)
CALCIUM SERPL-MCNC: 9.4 MG/DL (ref 8.6–10.2)
CHLORIDE SERPL-SCNC: 101 MMOL/L (ref 96–106)
CHOLEST SERPL-MCNC: 139 MG/DL (ref 100–199)
CO2 SERPL-SCNC: 24 MMOL/L (ref 20–29)
CREAT SERPL-MCNC: 0.98 MG/DL (ref 0.76–1.27)
GLOBULIN SER CALC-MCNC: 2.8 G/DL (ref 1.5–4.5)
GLUCOSE SERPL-MCNC: 90 MG/DL (ref 65–99)
HBA1C MFR BLD: 5.6 % (ref 4.8–5.6)
HDLC SERPL-MCNC: 48 MG/DL
IMP & REVIEW OF LAB RESULTS: NORMAL
LDLC SERPL CALC-MCNC: 79 MG/DL (ref 0–99)
POTASSIUM SERPL-SCNC: 4.8 MMOL/L (ref 3.5–5.2)
PROT SERPL-MCNC: 7.4 G/DL (ref 6–8.5)
SODIUM SERPL-SCNC: 139 MMOL/L (ref 134–144)
TRIGL SERPL-MCNC: 58 MG/DL (ref 0–149)
VLDLC SERPL CALC-MCNC: 12 MG/DL (ref 5–40)

## 2022-01-06 ENCOUNTER — CLINICAL SUPPORT (OUTPATIENT)
Dept: REHABILITATION | Facility: HOSPITAL | Age: 67
End: 2022-01-06
Attending: ORTHOPAEDIC SURGERY
Payer: MEDICARE

## 2022-01-06 DIAGNOSIS — M25.512 ACUTE PAIN OF LEFT SHOULDER: ICD-10-CM

## 2022-01-06 DIAGNOSIS — M25.612 DECREASED RANGE OF MOTION OF LEFT SHOULDER: ICD-10-CM

## 2022-01-06 DIAGNOSIS — Z98.890 S/P SHOULDER SURGERY: ICD-10-CM

## 2022-01-06 DIAGNOSIS — R29.898 SHOULDER WEAKNESS: ICD-10-CM

## 2022-01-06 PROCEDURE — 97110 THERAPEUTIC EXERCISES: CPT | Mod: PN

## 2022-01-06 NOTE — PROGRESS NOTES
[facility]   Physical Therapy Treatment Note     Name: Markell Keys  Clinic Number: [id number]    Therapy Diagnosis:   Encounter Diagnoses   Name Primary?    S/P shoulder surgery     Decreased range of motion of left shoulder     Shoulder weakness     Acute pain of left shoulder      Physician: Carmencita Ramirez MD    Visit Date: 1/6/2022    Physician: Carmencita Ramirez MD     Physician Orders: PT Eval and Treat   Medical Diagnosis from Referral: Nontraumatic complete tear of left rotator cuff  Evaluation Date: 11/5/2021  Authorization Period Expiration: 12/31/2021  Plan of Care Expiration: 7-5-22  Visit # / Visits authorized: 17/20   Progress Note Due: 1-  FOTO: 1/1  PTA Visit #: 1/5     DOS: 9-     POW: 15 weeks 1 day ( as of 1-6-2021)   Precautions:   POSTOPERATIVE PLAN: Plan to follow subscapularis-supraspinatus repair protocol (>3 cm in total size). Passive and active assisted motion at 6 weeks. No biceps resistive activity x 8 weeks. No cuff resistive activity x 12 weeks.    Time In: 7:08 am  Time Out: 7:46  am  Total Billable Time: 38  minutes      SUBJECTIVE     Pt reports: that he is experience increase of L shoulder soreness today sine he worked yesterday. Pt denies pain  He was compliant with home exercise program.  Response to previous treatment: good; no pain or soreness  Functional change: No change    Pain: 0/10  Location: left shoulder      OBJECTIVE     Objective Measures updated at progress report unless specified.       TREATMENT       Markell received the treatments listed below:      received therapeutic exercises to develop strength and ROM for 38  minutes including:    UBE 3/3 lvl 3  Standing scapular retraction on freemotion 7# 3x10   Standing shoulder extension on freemotion 7# 3x10  Standing shoulder ER RTB 30x  Standing shoulder IR +GTB 30x   +sustained scaption  20x  Ball walll (green ball) 3x30 sec CW and CCW  + wall clock 10 reps  YTB    Matrix rows 40# 30x   SL shoulder ER +2# 3x10  SL shoulder flexion  3x10  SL shoulder abd  +2# 3x10  Prone shoulder extension 4# 3x10  Prone rows 4#  2x10   Prone T's 3x10  supine chest press 5# wand 3x10   Seated  shoulder flexion with wand 5# 30x  Supine serratus punch 5# wand 3x10   Body blade at 0 deg flexion/abd 3x30 sec    received the following manual therapy techniques: Soft tissue Mobilization were applied to the: L shoulder  For 0 minutes, including:  PROM shoulder abd and ER  Shoulder oscillation   Joint mobs grade I    PATIENT EDUCATION AND HOME EXERCISES     Home Exercises Provided and Patient Education Provided     Education provided: .       Written Home Exercises Provided: Patient instructed to cont prior HEP. Exercises were reviewed and Markell was able to demonstrate them prior to the end of the session.  Markell demonstrated good  understanding of the education provided. See EMR under Patient Instructions for exercises provided during therapy sessions    ASSESSMENT     Pt is about 15 weeks and 1 days since surgery. Cont same exercises as last visit only progression of weights today. Pt rquired assitance during SL shoulder ER due to weakness. Pt with minor L shoulder pain during shoulder flexion from 60 to 120 deg, but pain subsided once he passes that range. Pt cont with L rotator cuff weakness. Pt required mod v/c's to perform exercises with proper form. Plan to cont skilled PT services according to protocol.     Markell is progressing well towards his goals.   Pt prognosis is Excellent.     Pt will continue to benefit from skilled outpatient physical therapy to address the deficits listed in the problem list box on initial evaluation, provide pt/family education and to maximize pt's level of independence in the home and community environment.     Pt's spiritual, cultural and educational needs considered and pt agreeable to plan of care and goals.     Anticipated barriers to physical therapy:  Transportation     GOALS: Short Term Goals: 4 weeks  1.Report decreased in pain at worse less than  <   / =  5  /10  to increase tolerance for functional mobility. Goal met 12-  2. Pt to improve L shoulder PROM to 90 deg flexion to allow for improved functional mobility to allow for improvement in IADLs. Goal met 12-  3. Pt will be out of sling with proper gait pattern to improve community ambulation. Goal met 12-  4. Pt to report be conscious of impaired sitting and standing posture daily to decrease pain. Goal met 12-  5. Pt to tolerate HEP to improve ROM and independence with ADL's. Goal met 12-     Long Term Goals: 28 weeks  1.Report decreased in pain at worse less than  <   / =  1 /10  to increase tolerance for functional mobility. On going  2.  Patient will demonstrate improved overall function per FOTO Survey to CI = at least 1% but < 20% impaired, limited or restricted score or less.On going  3.Increased L shoulder  MMT 1 grade to increase tolerance for ADL and work activities.On going  4. Pt to report and demonstrate proper posture in standing and sitting to decrease pain. On going  5. Pt to be Independent with HEP to improve ROM and independence with ADL's.On going  6. Pt to improve  L shoulder active range of motion to 160 to 170 deg in flexion and abd, 60 to 70 deg ER and 70 deg of IR to allow for improved functional mobility to allow for improvement in IADLs. On going    PLAN     Plan of care Certification: 11/5/2021 to 7-5-22    Armando García, PT

## 2022-01-11 ENCOUNTER — OFFICE VISIT (OUTPATIENT)
Dept: INTERNAL MEDICINE | Facility: CLINIC | Age: 67
End: 2022-01-11
Payer: MEDICARE

## 2022-01-11 ENCOUNTER — CLINICAL SUPPORT (OUTPATIENT)
Dept: REHABILITATION | Facility: HOSPITAL | Age: 67
End: 2022-01-11
Attending: ORTHOPAEDIC SURGERY
Payer: MEDICARE

## 2022-01-11 VITALS
SYSTOLIC BLOOD PRESSURE: 131 MMHG | HEART RATE: 77 BPM | TEMPERATURE: 98 F | DIASTOLIC BLOOD PRESSURE: 74 MMHG | BODY MASS INDEX: 32.4 KG/M2 | HEIGHT: 72 IN | WEIGHT: 239.19 LBS

## 2022-01-11 DIAGNOSIS — M17.0 BILATERAL PRIMARY OSTEOARTHRITIS OF KNEE: ICD-10-CM

## 2022-01-11 DIAGNOSIS — E66.09 CLASS 1 OBESITY DUE TO EXCESS CALORIES WITH SERIOUS COMORBIDITY AND BODY MASS INDEX (BMI) OF 32.0 TO 32.9 IN ADULT: ICD-10-CM

## 2022-01-11 DIAGNOSIS — K21.9 GASTRO-ESOPHAGEAL REFLUX DISEASE WITHOUT ESOPHAGITIS: ICD-10-CM

## 2022-01-11 DIAGNOSIS — R73.03 PREDIABETES: ICD-10-CM

## 2022-01-11 DIAGNOSIS — G47.33 OBSTRUCTIVE SLEEP APNEA (ADULT) (PEDIATRIC): ICD-10-CM

## 2022-01-11 DIAGNOSIS — Z12.5 PROSTATE CANCER SCREENING: ICD-10-CM

## 2022-01-11 DIAGNOSIS — M51.16 LUMBAR DISC HERNIATION WITH RADICULOPATHY: ICD-10-CM

## 2022-01-11 DIAGNOSIS — R29.898 SHOULDER WEAKNESS: ICD-10-CM

## 2022-01-11 DIAGNOSIS — J44.9 CHRONIC OBSTRUCTIVE PULMONARY DISEASE, UNSPECIFIED COPD TYPE: ICD-10-CM

## 2022-01-11 DIAGNOSIS — E78.00 PURE HYPERCHOLESTEROLEMIA, UNSPECIFIED: ICD-10-CM

## 2022-01-11 DIAGNOSIS — D64.9 ANEMIA, UNSPECIFIED TYPE: ICD-10-CM

## 2022-01-11 DIAGNOSIS — Z23 NEED FOR INFLUENZA VACCINATION: ICD-10-CM

## 2022-01-11 DIAGNOSIS — R06.02 SOB (SHORTNESS OF BREATH): ICD-10-CM

## 2022-01-11 DIAGNOSIS — M25.612 DECREASED RANGE OF MOTION OF LEFT SHOULDER: ICD-10-CM

## 2022-01-11 DIAGNOSIS — N52.9 ERECTILE DYSFUNCTION, UNSPECIFIED ERECTILE DYSFUNCTION TYPE: ICD-10-CM

## 2022-01-11 DIAGNOSIS — I10 ESSENTIAL (PRIMARY) HYPERTENSION: Primary | ICD-10-CM

## 2022-01-11 DIAGNOSIS — Z98.890 HISTORY OF LUMBAR LAMINECTOMY FOR SPINAL CORD DECOMPRESSION: ICD-10-CM

## 2022-01-11 DIAGNOSIS — M25.512 ACUTE PAIN OF LEFT SHOULDER: ICD-10-CM

## 2022-01-11 DIAGNOSIS — Z98.890 S/P SHOULDER SURGERY: ICD-10-CM

## 2022-01-11 PROCEDURE — 1126F PR PAIN SEVERITY QUANTIFIED, NO PAIN PRESENT: ICD-10-PCS | Mod: CPTII,S$GLB,, | Performed by: INTERNAL MEDICINE

## 2022-01-11 PROCEDURE — 1159F MED LIST DOCD IN RCRD: CPT | Mod: CPTII,S$GLB,, | Performed by: INTERNAL MEDICINE

## 2022-01-11 PROCEDURE — 99214 OFFICE O/P EST MOD 30 MIN: CPT | Mod: 25,S$GLB,, | Performed by: INTERNAL MEDICINE

## 2022-01-11 PROCEDURE — 4010F ACE/ARB THERAPY RXD/TAKEN: CPT | Mod: CPTII,S$GLB,, | Performed by: INTERNAL MEDICINE

## 2022-01-11 PROCEDURE — 1101F PR PT FALLS ASSESS DOC 0-1 FALLS W/OUT INJ PAST YR: ICD-10-PCS | Mod: CPTII,S$GLB,, | Performed by: INTERNAL MEDICINE

## 2022-01-11 PROCEDURE — 3078F DIAST BP <80 MM HG: CPT | Mod: CPTII,S$GLB,, | Performed by: INTERNAL MEDICINE

## 2022-01-11 PROCEDURE — 3075F SYST BP GE 130 - 139MM HG: CPT | Mod: CPTII,S$GLB,, | Performed by: INTERNAL MEDICINE

## 2022-01-11 PROCEDURE — 1160F RVW MEDS BY RX/DR IN RCRD: CPT | Mod: CPTII,S$GLB,, | Performed by: INTERNAL MEDICINE

## 2022-01-11 PROCEDURE — G0008 FLU VACCINE - QUADRIVALENT - ADJUVANTED: ICD-10-PCS | Mod: S$GLB,,, | Performed by: INTERNAL MEDICINE

## 2022-01-11 PROCEDURE — 3288F PR FALLS RISK ASSESSMENT DOCUMENTED: ICD-10-PCS | Mod: CPTII,S$GLB,, | Performed by: INTERNAL MEDICINE

## 2022-01-11 PROCEDURE — 3078F PR MOST RECENT DIASTOLIC BLOOD PRESSURE < 80 MM HG: ICD-10-PCS | Mod: CPTII,S$GLB,, | Performed by: INTERNAL MEDICINE

## 2022-01-11 PROCEDURE — 90694 VACC AIIV4 NO PRSRV 0.5ML IM: CPT | Mod: S$GLB,,, | Performed by: INTERNAL MEDICINE

## 2022-01-11 PROCEDURE — 3008F PR BODY MASS INDEX (BMI) DOCUMENTED: ICD-10-PCS | Mod: CPTII,S$GLB,, | Performed by: INTERNAL MEDICINE

## 2022-01-11 PROCEDURE — 97110 THERAPEUTIC EXERCISES: CPT | Mod: HCNC,PN

## 2022-01-11 PROCEDURE — 99214 PR OFFICE/OUTPT VISIT, EST, LEVL IV, 30-39 MIN: ICD-10-PCS | Mod: 25,S$GLB,, | Performed by: INTERNAL MEDICINE

## 2022-01-11 PROCEDURE — 1126F AMNT PAIN NOTED NONE PRSNT: CPT | Mod: CPTII,S$GLB,, | Performed by: INTERNAL MEDICINE

## 2022-01-11 PROCEDURE — 3288F FALL RISK ASSESSMENT DOCD: CPT | Mod: CPTII,S$GLB,, | Performed by: INTERNAL MEDICINE

## 2022-01-11 PROCEDURE — 1160F PR REVIEW ALL MEDS BY PRESCRIBER/CLIN PHARMACIST DOCUMENTED: ICD-10-PCS | Mod: CPTII,S$GLB,, | Performed by: INTERNAL MEDICINE

## 2022-01-11 PROCEDURE — 1159F PR MEDICATION LIST DOCUMENTED IN MEDICAL RECORD: ICD-10-PCS | Mod: CPTII,S$GLB,, | Performed by: INTERNAL MEDICINE

## 2022-01-11 PROCEDURE — 3075F PR MOST RECENT SYSTOLIC BLOOD PRESS GE 130-139MM HG: ICD-10-PCS | Mod: CPTII,S$GLB,, | Performed by: INTERNAL MEDICINE

## 2022-01-11 PROCEDURE — 3008F BODY MASS INDEX DOCD: CPT | Mod: CPTII,S$GLB,, | Performed by: INTERNAL MEDICINE

## 2022-01-11 PROCEDURE — 1101F PT FALLS ASSESS-DOCD LE1/YR: CPT | Mod: CPTII,S$GLB,, | Performed by: INTERNAL MEDICINE

## 2022-01-11 PROCEDURE — G0008 ADMIN INFLUENZA VIRUS VAC: HCPCS | Mod: S$GLB,,, | Performed by: INTERNAL MEDICINE

## 2022-01-11 PROCEDURE — 90694 FLU VACCINE - QUADRIVALENT - ADJUVANTED: ICD-10-PCS | Mod: S$GLB,,, | Performed by: INTERNAL MEDICINE

## 2022-01-11 PROCEDURE — 4010F PR ACE/ARB THEARPY RXD/TAKEN: ICD-10-PCS | Mod: CPTII,S$GLB,, | Performed by: INTERNAL MEDICINE

## 2022-01-11 RX ORDER — ROSUVASTATIN CALCIUM 20 MG/1
20 TABLET, COATED ORAL NIGHTLY
Qty: 90 TABLET | Refills: 3 | Status: SHIPPED | OUTPATIENT
Start: 2022-01-11 | End: 2022-11-10

## 2022-01-11 RX ORDER — OMEPRAZOLE 20 MG/1
20 CAPSULE, DELAYED RELEASE ORAL EVERY MORNING
Qty: 90 CAPSULE | Refills: 3 | Status: SHIPPED | OUTPATIENT
Start: 2022-01-11

## 2022-01-11 RX ORDER — LOSARTAN POTASSIUM 100 MG/1
100 TABLET ORAL NIGHTLY
Qty: 90 TABLET | Refills: 3 | Status: SHIPPED | OUTPATIENT
Start: 2022-01-11 | End: 2022-11-10

## 2022-01-11 RX ORDER — ALBUTEROL SULFATE 90 UG/1
2 AEROSOL, METERED RESPIRATORY (INHALATION) EVERY 6 HOURS PRN
Qty: 54 G | Refills: 3 | Status: SHIPPED | OUTPATIENT
Start: 2022-01-11 | End: 2023-01-11

## 2022-01-11 RX ORDER — CHLORTHALIDONE 25 MG/1
25 TABLET ORAL DAILY
Qty: 90 TABLET | Refills: 3 | Status: SHIPPED | OUTPATIENT
Start: 2022-01-11 | End: 2022-11-10

## 2022-01-11 RX ORDER — SILDENAFIL 100 MG/1
100 TABLET, FILM COATED ORAL DAILY PRN
Qty: 60 TABLET | Refills: 1 | Status: SHIPPED | OUTPATIENT
Start: 2022-01-11 | End: 2023-01-11

## 2022-01-11 RX ORDER — FERROUS SULFATE 325(65) MG
TABLET ORAL
Qty: 90 TABLET | Refills: 3 | Status: SHIPPED | OUTPATIENT
Start: 2022-01-11 | End: 2022-11-10

## 2022-01-11 RX ORDER — CARVEDILOL 12.5 MG/1
12.5 TABLET ORAL 2 TIMES DAILY
Qty: 180 TABLET | Refills: 3 | Status: SHIPPED | OUTPATIENT
Start: 2022-01-11 | End: 2022-11-10

## 2022-01-11 RX ORDER — METFORMIN HYDROCHLORIDE 500 MG/1
1000 TABLET, EXTENDED RELEASE ORAL EVERY MORNING
Qty: 90 TABLET | Refills: 3 | Status: SHIPPED | OUTPATIENT
Start: 2022-01-11 | End: 2022-06-22

## 2022-01-11 NOTE — PROGRESS NOTES
[facility]   Physical Therapy Treatment Note     Name: Markell Keys  Clinic Number: [id number]    Therapy Diagnosis:   Encounter Diagnoses   Name Primary?    S/P shoulder surgery     Decreased range of motion of left shoulder     Shoulder weakness     Acute pain of left shoulder      Physician: Carmencita Ramirez MD    Visit Date: 1/11/2022    Physician: Carmencita Ramirez MD     Physician Orders: PT Eval and Treat   Medical Diagnosis from Referral: Nontraumatic complete tear of left rotator cuff  Evaluation Date: 11/5/2021  Authorization Period Expiration: 12/31/2021  Plan of Care Expiration: 7-5-22  Visit # / Visits authorized: 18/20   Progress Note Due: 1-  FOTO: 1/1  PTA Visit #: 1/5     DOS: 9-     POW: 15 weeks 6 day ( as of 1-)   Precautions:   POSTOPERATIVE PLAN: Plan to follow subscapularis-supraspinatus repair protocol (>3 cm in total size). Passive and active assisted motion at 6 weeks. No biceps resistive activity x 8 weeks. No cuff resistive activity x 12 weeks.    Time In: 7:45 am  Time Out: 8:30  am  Total Billable Time: 40   minutes      SUBJECTIVE     Pt reports: that He does not have any L shoulder pain, but he cont with weakness.   He was compliant with home exercise program.  Response to previous treatment: good; no pain or soreness  Functional change: No change    Pain: 0/10  Location: left shoulder      OBJECTIVE     Objective Measures updated at progress report unless specified.       TREATMENT       Markell received the treatments listed below:      received therapeutic exercises to develop strength and ROM for 40  minutes including:    UBE 3/3 lvl 3  Standing scapular retraction on freemotion 7# 3x10   Standing shoulder extension on freemotion 7# 3x10  Standing shoulder ER RTB 30x  Standing shoulder IR +GTB 30x   Ball walll (green ball) 3x30 sec CW and CCW  wall clock 10 reps  YTB   Matrix rows 40# 30x   +Seated shoulder abd and ER 90/90  YTB 20x  SL shoulder ER eccentric 2x10  SL shoulder flexion  3x10  SL shoulder abd  +2# 3x10  Prone shoulder extension 4# 3x10  Prone T's 3x10  supine chest press 5# wand 3x10   Seated  shoulder flexion with wand 5# 30x  Supine serratus punch 5# wand 3x10   Body blade at 0 deg flexion/abd 3x30 sec    received the following manual therapy techniques: Soft tissue Mobilization were applied to the: L shoulder  For 0 minutes, including:  PROM shoulder abd and ER  Shoulder oscillation   Joint mobs grade I    PATIENT EDUCATION AND HOME EXERCISES     Home Exercises Provided and Patient Education Provided     Education provided: .       Written Home Exercises Provided: Patient instructed to cont prior HEP. Exercises were reviewed and Markell was able to demonstrate them prior to the end of the session.  Markell demonstrated good  understanding of the education provided. See EMR under Patient Instructions for exercises provided during therapy sessions    ASSESSMENT     Pt is about 15 weeks and 6 days since surgery. Progression of exercises with Seated shoulder abd and ER 90/90 YTB and eccentric SL shoulder ER. Pt tolerated well, but he demonstrated weakness of L rotator cuff muscles. Pt also demonstrated weakness of jose-scapular muscles. Pt has been demonstrating increase in muscles strength with exercises and functional mobility of L shoulder. Pt cont with difficult wall clock, SL shoulder ER, and prone T's. Min v/c's required to perform exercises with proper form. Plan to cont skilled PT services according to protocol.     Markell is progressing well towards his goals.   Pt prognosis is Excellent.     Pt will continue to benefit from skilled outpatient physical therapy to address the deficits listed in the problem list box on initial evaluation, provide pt/family education and to maximize pt's level of independence in the home and community environment.     Pt's spiritual, cultural and educational needs considered and pt agreeable  to plan of care and goals.     Anticipated barriers to physical therapy: Transportation     GOALS: Short Term Goals: 4 weeks  1.Report decreased in pain at worse less than  <   / =  5  /10  to increase tolerance for functional mobility. Goal met 12-  2. Pt to improve L shoulder PROM to 90 deg flexion to allow for improved functional mobility to allow for improvement in IADLs. Goal met 12-  3. Pt will be out of sling with proper gait pattern to improve community ambulation. Goal met 12-  4. Pt to report be conscious of impaired sitting and standing posture daily to decrease pain. Goal met 12-  5. Pt to tolerate HEP to improve ROM and independence with ADL's. Goal met 12-     Long Term Goals: 28 weeks  1.Report decreased in pain at worse less than  <   / =  1 /10  to increase tolerance for functional mobility. On going  2.  Patient will demonstrate improved overall function per FOTO Survey to CI = at least 1% but < 20% impaired, limited or restricted score or less.On going  3.Increased L shoulder  MMT 1 grade to increase tolerance for ADL and work activities.On going  4. Pt to report and demonstrate proper posture in standing and sitting to decrease pain. On going  5. Pt to be Independent with HEP to improve ROM and independence with ADL's.On going  6. Pt to improve  L shoulder active range of motion to 160 to 170 deg in flexion and abd, 60 to 70 deg ER and 70 deg of IR to allow for improved functional mobility to allow for improvement in IADLs. On going    PLAN     Plan of care Certification: 11/5/2021 to 7-5-22    Armando García PT

## 2022-01-11 NOTE — PROGRESS NOTES
Chief C/o:    Hypertension, Hyperlipidemia (Lab results check.), Gastroesophageal Reflux, and Pre-diabetes        Health Care Maintenance    Health Maintenance       Date Due Completion Date    Pneumococcal Vaccines (Age 65+) (1 of 2 - PPSV23) Never done ---    Shingles Vaccine (1 of 2) Never done ---    Abdominal Aortic Aneurysm Screening Never done ---    Colorectal Cancer Screening 04/15/2024 4/15/2019    Override on 7/25/2008: Done    Lipid Panel 01/04/2027 1/4/2022    TETANUS VACCINE 11/16/2030 11/16/2020                 HISTORY OF PRESENT ILLNESS:    ALCIDES Keys is a 66 y.o. male who presents to the clinic today for Hypertension, Hyperlipidemia (Lab results check.), Gastroesophageal Reflux, and Pre-diabetes  . Patient is having no chest pain, no shortness of breath, no fever no chills.  Patient is having no side effects related to his current medications.                  ALLERGIES AND MEDICATIONS: updated and reviewed.  Review of patient's allergies indicates:  No Known Allergies  Medication List with Changes/Refills   New Medications    METFORMIN (GLUCOPHAGE-XR) 500 MG ER 24HR TABLET    Take 2 tablets (1,000 mg total) by mouth every morning.   Current Medications    AMLODIPINE (NORVASC) 2.5 MG TABLET    Take 2.5 mg by mouth once daily.    ASCORBIC ACID (VITAMIN C) 500 MG CPSR    Take 500 mg by mouth every morning.     ASPIRIN (ECOTRIN) 81 MG EC TABLET    Take 81 mg by mouth every evening.     CHOLECALCIFEROL, VITAMIN D3, (VITAMIN D3) 50 MCG (2,000 UNIT) TAB    Take 1 tablet (2,000 Units total) by mouth once daily.    MULTIVIT-MIN/FA/LYCOPEN/LUTEIN (CENTRUM SILVER MEN ORAL)    Take by mouth every morning.     VITAMIN B COMPLEX-FOLIC ACID 50 MCG TAB    Take 1 tablet by mouth every morning.    Changed and/or Refilled Medications    Modified Medication Previous Medication    ALBUTEROL (VENTOLIN HFA) 90 MCG/ACTUATION INHALER albuterol (VENTOLIN HFA) 90 mcg/actuation inhaler       Inhale 2 puffs into the  lungs every 6 (six) hours as needed for Wheezing or Shortness of Breath. Rescue    Inhale 2 puffs into the lungs every 6 (six) hours as needed for Wheezing. Rescue    CARVEDILOL (COREG) 12.5 MG TABLET carvediloL (COREG) 12.5 MG tablet       Take 1 tablet (12.5 mg total) by mouth 2 (two) times daily.    Take 1 tablet (12.5 mg total) by mouth 2 (two) times daily.    CHLORTHALIDONE (HYGROTEN) 25 MG TAB chlorthalidone (HYGROTEN) 25 MG Tab       Take 1 tablet (25 mg total) by mouth once daily.    Take 25 mg by mouth once daily.    FERROUS SULFATE (FEROSUL) 325 MG (65 MG IRON) TAB TABLET FEROSUL 325 mg (65 mg iron) Tab tablet       TAKE 1 TABLET DAILY    TAKE 1 TABLET DAILY    FLUTICASONE-UMECLIDIN-VILANTER (TRELEGY ELLIPTA) 100-62.5-25 MCG DSDV TRELEGY ELLIPTA 100-62.5-25 mcg DsDv       Inhale 1 puff into the lungs once daily.    USE 1 INHALATION DAILY    LOSARTAN (COZAAR) 100 MG TABLET losartan (COZAAR) 100 MG tablet       Take 1 tablet (100 mg total) by mouth every evening.    TAKE 1 TABLET EVERY EVENING    OMEPRAZOLE (PRILOSEC) 20 MG CAPSULE omeprazole (PRILOSEC) 20 MG capsule       Take 1 capsule (20 mg total) by mouth every morning.    Take 20 mg by mouth every morning.     ROSUVASTATIN (CRESTOR) 20 MG TABLET rosuvastatin (CRESTOR) 20 MG tablet       Take 1 tablet (20 mg total) by mouth every evening.    Take 20 mg by mouth every evening.     SILDENAFIL (VIAGRA) 100 MG TABLET sildenafiL (VIAGRA) 100 MG tablet       Take 1 tablet (100 mg total) by mouth daily as needed for Erectile Dysfunction (As directed).    Take 1 tablet (100 mg total) by mouth daily as needed for Erectile Dysfunction (As directed).   Discontinued Medications    METFORMIN (GLUCOPHAGE) 500 MG TABLET    Take 1 tablet (500 mg total) by mouth 2 (two) times daily with meals.    ONDANSETRON (ZOFRAN-ODT) 4 MG TBDL    Dissolve 1 tablet (4 mg total) by mouth every 6 (six) hours as needed (nausea).       Problem List:  Patient Active Problem List    Diagnosis    Essential (primary) hypertension    Chronic obstructive pulmonary disease, unspecified    Prediabetes    Pure hypercholesterolemia, unspecified    Unspecified hearing loss, bilateral    Obstructive sleep apnea (adult) (pediatric), intolerant to CPAP    Gastro-esophageal reflux disease without esophagitis    Male erectile dysfunction, unspecified    Bilateral primary osteoarthritis of knee    SOB (shortness of breath)    Anemia    Class 1 obesity due to excess calories with serious comorbidity and body mass index (BMI) of 32.0 to 32.9 in adult    History of lumbar laminectomy for spinal cord decompression    Hyperkalemia    Lumbar disc herniation with radiculopathy    Chronic left shoulder pain    Full thickness rotator cuff tear    S/P shoulder surgery    Decreased range of motion of left shoulder    Shoulder weakness         CARE TEAM:    Patient Care Team:  Rica Martinez MD as PCP - General (Internal Medicine)  Cindy Villegas LPN as Licensed Practical Nurse  Luciano Georges II, MD as Consulting Physician (Gastroenterology)         REVIEW OF SYSTEMS:    Review of Systems   Constitutional: Negative for appetite change, chills, diaphoresis, fatigue, fever and unexpected weight change.   HENT: Negative for congestion, drooling, ear discharge, ear pain, facial swelling, hearing loss, nosebleeds, rhinorrhea, sinus pain, sneezing, sore throat, tinnitus, trouble swallowing and voice change.    Eyes: Negative for pain, discharge, redness, itching and visual disturbance.   Respiratory: Negative for cough, choking, chest tightness, shortness of breath, wheezing and stridor.    Cardiovascular: Negative for chest pain, palpitations and leg swelling.   Gastrointestinal: Negative for abdominal distention, abdominal pain, blood in stool, constipation, diarrhea, nausea and vomiting.   Endocrine: Negative for cold intolerance, heat intolerance, polydipsia, polyphagia and  polyuria.   Genitourinary: Negative for difficulty urinating, dysuria, flank pain, frequency, hematuria and urgency.   Musculoskeletal: Positive for arthralgias (Left shoulder pain) and back pain (Low back pain with radiation to the right hip and right lower extremity). Negative for gait problem, joint swelling, myalgias, neck pain and neck stiffness.   Skin: Negative for color change, pallor, rash and wound.   Allergic/Immunologic: Negative for environmental allergies, food allergies and immunocompromised state.   Neurological: Negative for dizziness, tremors, seizures, syncope, speech difficulty, weakness, light-headedness, numbness and headaches.   Hematological: Negative for adenopathy. Does not bruise/bleed easily.   Psychiatric/Behavioral: Negative for agitation, behavioral problems, confusion, decreased concentration, dysphoric mood, hallucinations, sleep disturbance and suicidal ideas. The patient is not nervous/anxious.          PHYSICAL EXAM:    Vitals:    01/11/22 0858   BP: 131/74   Pulse: 77   Temp: 97.7 °F (36.5 °C)     Weight: 108.5 kg (239 lb 3.2 oz)   Height: 6' (182.9 cm)   Body mass index is 32.44 kg/m².  Vitals:    01/11/22 0858   BP: 131/74   Pulse: 77   Temp: 97.7 °F (36.5 °C)   TempSrc: Temporal   Weight: 108.5 kg (239 lb 3.2 oz)   Height: 6' (1.829 m)   PainSc: 0-No pain          Physical Exam  Vitals and nursing note reviewed.   Constitutional:       General: He is not in acute distress.     Appearance: He is obese. He is not ill-appearing, toxic-appearing or diaphoretic.      Comments: Patient is alert, awake and oriented X 3.  Patient is comfortable, cooperative and in no apparent distress.     HENT:      Head: Normocephalic and atraumatic.      Right Ear: Tympanic membrane, ear canal and external ear normal. There is no impacted cerumen.      Left Ear: Tympanic membrane, ear canal and external ear normal. There is no impacted cerumen.      Nose: Nose normal. No congestion or rhinorrhea.       Mouth/Throat:      Mouth: Mucous membranes are moist.      Pharynx: Oropharynx is clear. No oropharyngeal exudate or posterior oropharyngeal erythema.   Eyes:      General: No scleral icterus.        Right eye: No discharge.         Left eye: No discharge.      Extraocular Movements: Extraocular movements intact.      Conjunctiva/sclera: Conjunctivae normal.      Pupils: Pupils are equal, round, and reactive to light.   Cardiovascular:      Rate and Rhythm: Normal rate and regular rhythm.      Pulses: Normal pulses.      Heart sounds: Normal heart sounds. No murmur heard.  No friction rub. No gallop.    Pulmonary:      Effort: Pulmonary effort is normal. No respiratory distress.      Breath sounds: Normal breath sounds. No stridor. No wheezing, rhonchi or rales.   Chest:      Chest wall: No tenderness.   Abdominal:      General: Bowel sounds are normal. There is no distension.      Palpations: Abdomen is soft. There is no mass.      Tenderness: There is no abdominal tenderness. There is no guarding or rebound.      Hernia: No hernia is present.   Musculoskeletal:         General: No swelling, tenderness, deformity or signs of injury. Normal range of motion.      Cervical back: Normal range of motion and neck supple. No rigidity.      Right lower leg: No edema.      Left lower leg: No edema.      Comments: Decreased range of motion of the left shoulder especially abduction   Lymphadenopathy:      Cervical: No cervical adenopathy.   Skin:     General: Skin is warm and dry.      Capillary Refill: Capillary refill takes less than 2 seconds.      Coloration: Skin is not jaundiced or pale.      Findings: No bruising, erythema, lesion or rash.   Neurological:      General: No focal deficit present.      Mental Status: He is alert and oriented to person, place, and time.      Cranial Nerves: No cranial nerve deficit.      Sensory: No sensory deficit.      Motor: No weakness.      Coordination: Coordination normal.       Deep Tendon Reflexes: Reflexes normal.   Psychiatric:         Mood and Affect: Mood normal.         Behavior: Behavior normal.         Thought Content: Thought content normal.         Judgment: Judgment normal.            Labs:  Discussed with patient.    Lab Results   Component Value Date    GLU 90 01/04/2022     01/04/2022    K 4.8 01/04/2022     01/04/2022    CO2 24 01/04/2022    BUN 16 01/04/2022    CREATININE 0.98 01/04/2022    CALCIUM 9.4 01/04/2022    PROT 7.7 09/17/2021    ALBUMIN 4.6 01/04/2022    ALBUMIN 3.9 09/17/2021    BILITOT 0.5 01/04/2022    ALKPHOS 58 09/17/2021    AST 20 01/04/2022    ALT 24 01/04/2022    ANIONGAP 9 09/17/2021    ESTGFRAFRICA >60 09/17/2021    EGFRNONAA 81 01/04/2022     Lab Results   Component Value Date    WBC 7.73 09/17/2021    RBC 4.55 (L) 09/17/2021    HGB 13.5 (L) 09/17/2021    HCT 40.3 09/17/2021    MCV 89 09/17/2021    RDW 13.3 09/17/2021     09/17/2021      Lab Results   Component Value Date    CHOL 139 01/04/2022    TRIG 58 01/04/2022    TRIG 61 03/08/2019    HDL 48 01/04/2022    HDL 48 03/08/2019    LDLCALC 79 01/04/2022     Lab Results   Component Value Date    TSH 0.942 06/25/2021     Lab Results   Component Value Date    HGBA1C 5.6 01/04/2022    HGBA1C 6.2 (H) 11/10/2020      No components found for: MICROALBUMIN/CREATININE    ASSESSMENT & PLAN:    1. Essential (primary) hypertension  - losartan (COZAAR) 100 MG tablet; Take 1 tablet (100 mg total) by mouth every evening.  Dispense: 90 tablet; Refill: 3  The current medical regimen is effective;  continue present plan and medications.  2. Pure hypercholesterolemia, unspecified  - rosuvastatin (CRESTOR) 20 MG tablet; Take 1 tablet (20 mg total) by mouth every evening.  Dispense: 90 tablet; Refill: 3  The current medical regimen is effective;  continue present plan and medications.  3. Prediabetes   Blood sugar is in the range of prediabetes.  In simple words, blood sugar is elevated more than the  upper limit of normal, and less than that of Diabetes Mellitus.   Healthy diet, exercise and weight management are essential, to prevent or decrease chances of progression to f clinical Diabetes Mellitus.  4. Anemia, unspecified type  - ferrous sulfate (FEROSUL) 325 mg (65 mg iron) Tab tablet; TAKE 1 TABLET DAILY  Dispense: 90 tablet; Refill: 3  Mild, will keep monitoring  5. Gastro-esophageal reflux disease without esophagitis  - omeprazole (PRILOSEC) 20 MG capsule; Take 1 capsule (20 mg total) by mouth every morning.  Dispense: 90 capsule; Refill: 3  The current medical regimen is effective;  continue present plan and medications.  6. Obstructive sleep apnea (adult) (pediatric), intolerant to CPAP    7. Bilateral primary osteoarthritis of knee    8. History of lumbar laminectomy for spinal cord decompression    9. Lumbar disc herniation with radiculopathy    10. Chronic obstructive pulmonary disease, unspecified COPD type  - albuterol (VENTOLIN HFA) 90 mcg/actuation inhaler; Inhale 2 puffs into the lungs every 6 (six) hours as needed for Wheezing or Shortness of Breath. Rescue  Dispense: 54 g; Refill: 3  - fluticasone-umeclidin-vilanter (TRELEGY ELLIPTA) 100-62.5-25 mcg DsDv; Inhale 1 puff into the lungs once daily.  Dispense: 3 each; Refill: 3    11. SOB (shortness of breath)  - fluticasone-umeclidin-vilanter (TRELEGY ELLIPTA) 100-62.5-25 mcg DsDv; Inhale 1 puff into the lungs once daily.  Dispense: 3 each; Refill: 3    12. Erectile dysfunction, unspecified erectile dysfunction type  - sildenafiL (VIAGRA) 100 MG tablet; Take 1 tablet (100 mg total) by mouth daily as needed for Erectile Dysfunction (As directed).  Dispense: 60 tablet; Refill: 1    13. Need for influenza vaccination  - Influenza (FLUAD) - Quadrivalent (Adjuvanted) *Preferred* (65+) (PF)    14. Prostate cancer screening  - PSA, Screening; Future  - PSA, Screening    15. Class 1 obesity due to excess calories with serious comorbidity and body mass  index (BMI) of 32.0 to 32.9 in adult  Healthy diet, exercise, and weight monitoring are essential for weight loss.   Low-fat diet, increase vegetables, learn how to count calories, check weight every morning and keep a diet and weight diary.  Bring the diary next visit.  Try to identify one specific food item or habit that you can improve, try to stick to it and add another item after 2-4 weeks interval. If you are not improving as you wish, bring your food diary and we will try, together, find something specific that we can work on.            Orders Placed This Encounter   Procedures    Influenza (FLUAD) - Quadrivalent (Adjuvanted) *Preferred* (65+) (PF)    PSA, Screening      Follow up in about 3 months (around 4/11/2022). or sooner as needed.    Patient was counseled and questions and concerns were addressed.    Please note:  Parts of this report were done using a dictation software, voice to text, and sometimes the text contains some uncorrected words or sentences that are missed during revision.

## 2022-01-12 NOTE — PROGRESS NOTES
[facility]   Physical Therapy Treatment Note     Name: Markell Keys  Clinic Number: [id number]    Therapy Diagnosis:   No diagnosis found.  Physician: Carmencita Ramirez MD    Visit Date: 1/13/2022    Physician: Carmencita Ramirez MD     Physician Orders: PT Eval and Treat   Medical Diagnosis from Referral: Nontraumatic complete tear of left rotator cuff  Evaluation Date: 11/5/2021  Authorization Period Expiration: 12/31/2021  Plan of Care Expiration: 7-5-22  Visit # / Visits authorized: 18/20   Progress Note Due: 1-  FOTO: 1/1  PTA Visit #: 1/5     DOS: 9-     POW: 16 weeks 1 day ( as of 1-)   Precautions:   POSTOPERATIVE PLAN: Plan to follow subscapularis-supraspinatus repair protocol (>3 cm in total size). Passive and active assisted motion at 6 weeks. No biceps resistive activity x 8 weeks. No cuff resistive activity x 12 weeks.    Time In: 7:00 am  Time Out: 7:40  am  Total Billable Time: 40   minutes      SUBJECTIVE     Pt reports: that he does not have any L shoulder pain. Pt experience increase of muscles soreness after last PT session.   He was compliant with home exercise program.  Response to previous treatment: good; no pain or soreness  Functional change: No change    Pain: 0/10  Location: left shoulder      OBJECTIVE     Objective Measures updated at progress report unless specified.       TREATMENT       Markell received the treatments listed below:      received therapeutic exercises to develop strength and ROM for 40  minutes including:    UBE 3/3 lvl 3  Standing scapular retraction on freemotion 7# 3x10   Standing shoulder extension on freemotion 7# 3x10  Standing shoulder ER RTB 30x  Standing shoulder IR +GTB 30x    Seated lasts pull down at squat rack 20# 3x10   Ball walll (red ball) 3x30 sec CW and CCW  wall clock 10 reps  YTB   Place 10 cones in the second shelf cabinet with 4# around wrist 3 trials   Matrix rows 50# 30x   +Seated shoulder abd  and ER 90/90 YTB 20x  SL shoulder ER eccentric 2x10  SL shoulder flexion  1# 3x10  SL shoulder abd  +2# 3x10  Prone T's 1# 3x10  Prone Y's 3x10   supine chest press 5# wand 3x10   Seated  shoulder flexion with wand 5# 30x  Supine serratus punch 5# wand 3x10   Body blade at 0 deg flexion/abd 3x30 sec    received the following manual therapy techniques: Soft tissue Mobilization were applied to the: L shoulder  For 0 minutes, including:  PROM shoulder abd and ER  Shoulder oscillation   Joint mobs grade I    PATIENT EDUCATION AND HOME EXERCISES     Home Exercises Provided and Patient Education Provided     Education provided: .       Written Home Exercises Provided: Patient instructed to cont prior HEP. Exercises were reviewed and Markell was able to demonstrate them prior to the end of the session.  Marekll demonstrated good  understanding of the education provided. See EMR under Patient Instructions for exercises provided during therapy sessions    ASSESSMENT     Pt is about 16 weeks and 1 days since surgery. Progression of exercises with place cones in shelf, prone Y and lats pull down. Pt demonstrated increase of L rotator cuff and jose-scapular muscles fatigue. Cont with difficult during SL shoulder ER eccentric due to weakness of L rotator cuff muscles. Pt has been demonstrating increase in muscles strength with exercises and functional mobility of L shoulder. Pt cont with difficult wall clock, SL shoulder ER, and prone T's. Min v/c's required to perform exercises with proper form. Plan to cont skilled PT services according to protocol.     Markell is progressing well towards his goals.   Pt prognosis is Excellent.     Pt will continue to benefit from skilled outpatient physical therapy to address the deficits listed in the problem list box on initial evaluation, provide pt/family education and to maximize pt's level of independence in the home and community environment.     Pt's spiritual, cultural and educational needs  considered and pt agreeable to plan of care and goals.     Anticipated barriers to physical therapy: Transportation     GOALS: Short Term Goals: 4 weeks  1.Report decreased in pain at worse less than  <   / =  5  /10  to increase tolerance for functional mobility. Goal met 12-  2. Pt to improve L shoulder PROM to 90 deg flexion to allow for improved functional mobility to allow for improvement in IADLs. Goal met 12-  3. Pt will be out of sling with proper gait pattern to improve community ambulation. Goal met 12-  4. Pt to report be conscious of impaired sitting and standing posture daily to decrease pain. Goal met 12-  5. Pt to tolerate HEP to improve ROM and independence with ADL's. Goal met 12-     Long Term Goals: 28 weeks  1.Report decreased in pain at worse less than  <   / =  1 /10  to increase tolerance for functional mobility. On going  2.  Patient will demonstrate improved overall function per FOTO Survey to CI = at least 1% but < 20% impaired, limited or restricted score or less.On going  3.Increased L shoulder  MMT 1 grade to increase tolerance for ADL and work activities.On going  4. Pt to report and demonstrate proper posture in standing and sitting to decrease pain. On going  5. Pt to be Independent with HEP to improve ROM and independence with ADL's.On going  6. Pt to improve  L shoulder active range of motion to 160 to 170 deg in flexion and abd, 60 to 70 deg ER and 70 deg of IR to allow for improved functional mobility to allow for improvement in IADLs. On going    PLAN     Plan of care Certification: 11/5/2021 to 7-5-22    Armando García PT

## 2022-01-13 ENCOUNTER — CLINICAL SUPPORT (OUTPATIENT)
Dept: REHABILITATION | Facility: HOSPITAL | Age: 67
End: 2022-01-13
Attending: ORTHOPAEDIC SURGERY
Payer: MEDICARE

## 2022-01-13 DIAGNOSIS — M25.612 DECREASED RANGE OF MOTION OF LEFT SHOULDER: ICD-10-CM

## 2022-01-13 DIAGNOSIS — R29.898 SHOULDER WEAKNESS: ICD-10-CM

## 2022-01-13 DIAGNOSIS — Z98.890 S/P SHOULDER SURGERY: ICD-10-CM

## 2022-01-13 PROCEDURE — 97110 THERAPEUTIC EXERCISES: CPT | Mod: HCNC,PN

## 2022-01-18 ENCOUNTER — CLINICAL SUPPORT (OUTPATIENT)
Dept: REHABILITATION | Facility: HOSPITAL | Age: 67
End: 2022-01-18
Attending: ORTHOPAEDIC SURGERY
Payer: MEDICARE

## 2022-01-18 DIAGNOSIS — Z98.890 S/P SHOULDER SURGERY: ICD-10-CM

## 2022-01-18 DIAGNOSIS — R29.898 SHOULDER WEAKNESS: ICD-10-CM

## 2022-01-18 DIAGNOSIS — M25.612 DECREASED RANGE OF MOTION OF LEFT SHOULDER: ICD-10-CM

## 2022-01-18 PROCEDURE — 97110 THERAPEUTIC EXERCISES: CPT | Mod: HCNC,PN

## 2022-01-18 NOTE — PROGRESS NOTES
[facility]   Physical Therapy Treatment Note     Name: Markell Keys  Clinic Number: [id number]    Therapy Diagnosis:   Encounter Diagnoses   Name Primary?    S/P shoulder surgery     Decreased range of motion of left shoulder     Shoulder weakness      Physician: Carmencita Ramirez MD    Visit Date: 1/18/2022    Physician: Carmencita Ramirez MD     Physician Orders: PT Eval and Treat   Medical Diagnosis from Referral: Nontraumatic complete tear of left rotator cuff  Evaluation Date: 11/5/2021  Authorization Period Expiration: 12/31/2021  Plan of Care Expiration: 7-5-22  Visit # / Visits authorized: 19/20   Progress Note Due: 1-  FOTO: 1/1  PTA Visit #: 1/5     DOS: 9-     POW: 16 weeks 6 day ( as of 1-)   Precautions:   POSTOPERATIVE PLAN: Plan to follow subscapularis-supraspinatus repair protocol (>3 cm in total size). Passive and active assisted motion at 6 weeks. No biceps resistive activity x 8 weeks. No cuff resistive activity x 12 weeks.    Time In: 7:45 am  Time Out: 8:30 am  Total Billable Time: 45  minutes      SUBJECTIVE     Pt reports: that he does not have any L shoulder pain, but he has L shoulder weakness. He is having difficult to lift objects.   He was compliant with home exercise program.  Response to previous treatment: good; no pain or soreness  Functional change: No change    Pain: 0/10  Location: left shoulder      OBJECTIVE     Objective Measures updated at progress report unless specified.       TREATMENT       Markell received the treatments listed below:      received therapeutic exercises to develop strength and ROM for 45  minutes including:    UBE 3/3 lvl 3.5  Standing scapular retraction on freemotion 10# 3x10   Standing shoulder extension on freemotion 10# 3x10  Standing shoulder ER RTB 30x  Standing shoulder IR +GTB 30x    Shoulder abd 3x10  Shoulder flexion 1# 3x10   Seated lasts pull down at squat rack 30# 3x10   Ball walll (red  ball) 3x30 sec CW and CCW  wall clock 10 reps  YTB   +Wall slide with lift off YTB 10x  Place 10 cones in the second shelf cabinet with 4# around wrist 3 trials   Matrix rows 50# 30x   +Seated shoulder abd and ER 90/90 YTB 30x  SL shoulder ER eccentric 2x10  SL shoulder flexion  1# 3x10  SL shoulder abd  +2# 3x10  Prone T's 1# 3x10  Prone Y's 3x10   supine chest press 5# wand 3x10   Seated  shoulder flexion with wand 5# 30x  Supine serratus punch 5# wand 3x10   Body blade at 0 deg flexion/abd 3x30 sec    received the following manual therapy techniques: Soft tissue Mobilization were applied to the: L shoulder  For 0 minutes, including:  PROM shoulder abd and ER  Shoulder oscillation   Joint mobs grade I    PATIENT EDUCATION AND HOME EXERCISES     Home Exercises Provided and Patient Education Provided     Education provided: .       Written Home Exercises Provided: Patient instructed to cont prior HEP. Exercises were reviewed and Markell was able to demonstrate them prior to the end of the session.  Markell demonstrated good  understanding of the education provided. See EMR under Patient Instructions for exercises provided during therapy sessions    ASSESSMENT     Pt is about 16 weeks and 6 days since surgery. Progression of exercises with wall slide with lift off, Standing shoulder abd, standing shoulder flexion. Pt cont with weakness of L rotator cuff and jose-scapular muscles. Pt is unable to lift object with L hand at work due to muscles weakness. Cont with difficult during SL shoulder ER eccentric due to weakness of L rotator cuff muscles. Pt has been demonstrating increase in muscles strength with exercises and functional mobility of L shoulder. Pt cont with difficult wall clock, SL shoulder ER, and prone T's. Min v/c's required to perform exercises with proper form. Plan to cont skilled PT services according to protocol.     Markell is progressing well towards his goals.   Pt prognosis is Excellent.     Pt will  continue to benefit from skilled outpatient physical therapy to address the deficits listed in the problem list box on initial evaluation, provide pt/family education and to maximize pt's level of independence in the home and community environment.     Pt's spiritual, cultural and educational needs considered and pt agreeable to plan of care and goals.     Anticipated barriers to physical therapy: Transportation     GOALS: Short Term Goals: 4 weeks  1.Report decreased in pain at worse less than  <   / =  5  /10  to increase tolerance for functional mobility. Goal met 12-  2. Pt to improve L shoulder PROM to 90 deg flexion to allow for improved functional mobility to allow for improvement in IADLs. Goal met 12-  3. Pt will be out of sling with proper gait pattern to improve community ambulation. Goal met 12-  4. Pt to report be conscious of impaired sitting and standing posture daily to decrease pain. Goal met 12-  5. Pt to tolerate HEP to improve ROM and independence with ADL's. Goal met 12-     Long Term Goals: 28 weeks  1.Report decreased in pain at worse less than  <   / =  1 /10  to increase tolerance for functional mobility. On going  2.  Patient will demonstrate improved overall function per FOTO Survey to CI = at least 1% but < 20% impaired, limited or restricted score or less.On going  3.Increased L shoulder  MMT 1 grade to increase tolerance for ADL and work activities.On going  4. Pt to report and demonstrate proper posture in standing and sitting to decrease pain. On going  5. Pt to be Independent with HEP to improve ROM and independence with ADL's.On going  6. Pt to improve  L shoulder active range of motion to 160 to 170 deg in flexion and abd, 60 to 70 deg ER and 70 deg of IR to allow for improved functional mobility to allow for improvement in IADLs. On going    PLAN     Plan of care Certification: 11/5/2021 to 7-5-22    Armando García PT

## 2022-01-20 ENCOUNTER — CLINICAL SUPPORT (OUTPATIENT)
Dept: REHABILITATION | Facility: HOSPITAL | Age: 67
End: 2022-01-20
Attending: ORTHOPAEDIC SURGERY
Payer: MEDICARE

## 2022-01-20 DIAGNOSIS — Z98.890 S/P SHOULDER SURGERY: ICD-10-CM

## 2022-01-20 DIAGNOSIS — M25.612 DECREASED RANGE OF MOTION OF LEFT SHOULDER: ICD-10-CM

## 2022-01-20 DIAGNOSIS — R29.898 SHOULDER WEAKNESS: ICD-10-CM

## 2022-01-20 PROCEDURE — 97140 MANUAL THERAPY 1/> REGIONS: CPT | Mod: HCNC,PN

## 2022-01-20 PROCEDURE — 97110 THERAPEUTIC EXERCISES: CPT | Mod: HCNC,PN

## 2022-01-20 NOTE — PROGRESS NOTES
[facility]   Physical Therapy Treatment Note     Name: Markell Keys  Clinic Number: [id number]    Therapy Diagnosis:   No diagnosis found.  Physician: Carmencita Ramirez MD    Visit Date: 1/20/2022    Physician: Carmencita Ramirez MD     Physician Orders: PT Eval and Treat   Medical Diagnosis from Referral: Nontraumatic complete tear of left rotator cuff  Evaluation Date: 11/5/2021  Authorization Period Expiration: 12/31/2021  Plan of Care Expiration: 7-5-22  Visit # / Visits authorized: 19/20   Progress Note Due: 1-  FOTO: 1/1  PTA Visit #: 1/5     DOS: 9-     POW: 16 weeks 6 day ( as of 1-)   Precautions:   POSTOPERATIVE PLAN: Plan to follow subscapularis-supraspinatus repair protocol (>3 cm in total size). Passive and active assisted motion at 6 weeks. No biceps resistive activity x 8 weeks. No cuff resistive activity x 12 weeks.    Time In: 7:45 am  Time Out: 8:30 am  Total Billable Time: 45  minutes      SUBJECTIVE     Pt reports: that he does not have any L shoulder pain, but he has L shoulder weakness. He is having difficult to lift objects.   He was compliant with home exercise program.  Response to previous treatment: good; no pain or soreness  Functional change: No change    Pain: 0/10  Location: left shoulder      OBJECTIVE     Objective Measures updated at progress report unless specified.       TREATMENT       Markell received the treatments listed below:      received therapeutic exercises to develop strength and ROM for 45  minutes including:    UBE 3/3 lvl 3.5  Standing scapular retraction on freemotion 10# 3x10   Standing shoulder extension on freemotion 10# 3x10  Standing shoulder ER RTB 30x  Standing shoulder IR +GTB 30x    Shoulder abd 3x10  Shoulder flexion 1# 3x10   Seated lasts pull down at squat rack 30# 3x10   Ball walll (red ball) 3x30 sec CW and CCW  wall clock 10 reps  YTB   +Wall slide with lift off YTB 10x  Place 10 cones in the second  shelf cabinet with 4# around wrist 3 trials   Matrix rows 50# 30x   +Seated shoulder abd and ER 90/90 YTB 30x  SL shoulder ER eccentric 2x10  SL shoulder flexion  1# 3x10  SL shoulder abd  +2# 3x10  Prone T's 1# 3x10  Prone Y's 3x10   supine chest press 5# wand 3x10   Seated  shoulder flexion with wand 5# 30x  Supine serratus punch 5# wand 3x10   Body blade at 0 deg flexion/abd 3x30 sec    received the following manual therapy techniques: Soft tissue Mobilization were applied to the: L shoulder  For 0 minutes, including:  PROM shoulder abd and ER  Shoulder oscillation   Joint mobs grade I    PATIENT EDUCATION AND HOME EXERCISES     Home Exercises Provided and Patient Education Provided     Education provided: .       Written Home Exercises Provided: Patient instructed to cont prior HEP. Exercises were reviewed and Markell was able to demonstrate them prior to the end of the session.  Markell demonstrated good  understanding of the education provided. See EMR under Patient Instructions for exercises provided during therapy sessions    ASSESSMENT     Pt is about 16 weeks and 6 days since surgery. Progression of exercises with wall slide with lift off, Standing shoulder abd, standing shoulder flexion. Pt cont with weakness of L rotator cuff and jose-scapular muscles. Pt is unable to lift object with L hand at work due to muscles weakness. Cont with difficult during SL shoulder ER eccentric due to weakness of L rotator cuff muscles. Pt has been demonstrating increase in muscles strength with exercises and functional mobility of L shoulder. Pt cont with difficult wall clock, SL shoulder ER, and prone T's. Min v/c's required to perform exercises with proper form. Plan to cont skilled PT services according to protocol.     Markell is progressing well towards his goals.   Pt prognosis is Excellent.     Pt will continue to benefit from skilled outpatient physical therapy to address the deficits listed in the problem list box on  initial evaluation, provide pt/family education and to maximize pt's level of independence in the home and community environment.     Pt's spiritual, cultural and educational needs considered and pt agreeable to plan of care and goals.     Anticipated barriers to physical therapy: Transportation     GOALS: Short Term Goals: 4 weeks  1.Report decreased in pain at worse less than  <   / =  5  /10  to increase tolerance for functional mobility. Goal met 12-  2. Pt to improve L shoulder PROM to 90 deg flexion to allow for improved functional mobility to allow for improvement in IADLs. Goal met 12-  3. Pt will be out of sling with proper gait pattern to improve community ambulation. Goal met 12-  4. Pt to report be conscious of impaired sitting and standing posture daily to decrease pain. Goal met 12-  5. Pt to tolerate HEP to improve ROM and independence with ADL's. Goal met 12-     Long Term Goals: 28 weeks  1.Report decreased in pain at worse less than  <   / =  1 /10  to increase tolerance for functional mobility. On going  2.  Patient will demonstrate improved overall function per FOTO Survey to CI = at least 1% but < 20% impaired, limited or restricted score or less.On going  3.Increased L shoulder  MMT 1 grade to increase tolerance for ADL and work activities.On going  4. Pt to report and demonstrate proper posture in standing and sitting to decrease pain. On going  5. Pt to be Independent with HEP to improve ROM and independence with ADL's.On going  6. Pt to improve  L shoulder active range of motion to 160 to 170 deg in flexion and abd, 60 to 70 deg ER and 70 deg of IR to allow for improved functional mobility to allow for improvement in IADLs. On going    PLAN     Plan of care Certification: 11/5/2021 to 7-5-22    Armando García PT

## 2022-01-20 NOTE — PROGRESS NOTES
[facility]   Physical Therapy Treatment Note     Name: Markell Keys  Clinic Number: [id number]    Therapy Diagnosis:   Encounter Diagnoses   Name Primary?    S/P shoulder surgery     Decreased range of motion of left shoulder     Shoulder weakness      Physician: Carmencita Ramirez MD    Visit Date: 1/20/2022    Physician: Carmencita Ramirez MD     Physician Orders: PT Eval and Treat   Medical Diagnosis from Referral: Nontraumatic complete tear of left rotator cuff  Evaluation Date: 11/5/2021  Authorization Period Expiration: 12/31/2021  Plan of Care Expiration: 7-5-22  Visit # / Visits authorized: 19/20   Progress Note Due: 1-  FOTO: 1/1  PTA Visit #: 1/5     DOS: 9-     POW: 16 weeks 6 day ( as of 1-)   Precautions:   POSTOPERATIVE PLAN: Plan to follow subscapularis-supraspinatus repair protocol (>3 cm in total size). Passive and active assisted motion at 6 weeks. No biceps resistive activity x 8 weeks. No cuff resistive activity x 12 weeks.    Time In: 7:02  Time Out: 7:45 am  Total Billable Time: 43  minutes      SUBJECTIVE     Pt reports:that he has no pain right now, but he still has pain lifting over 2 lbs.  He was compliant with home exercise program.  Response to previous treatment: good; no pain or soreness  Functional change: No change    Pain: 0/10  Location: left shoulder      OBJECTIVE     Objective Measures updated at progress report unless specified.       TREATMENT       Markell received the treatments listed below:      received therapeutic exercises to develop strength and ROM for 28  minutes including:    UBE 3/3 lvl 3.5  Standing scapular retraction on freemotion 10# 3x10   Standing shoulder extension on freemotion 10# 3x10  Standing shoulder ER RTB 30x  Standing shoulder IR +GTB 30x  +Prone 90/90 shoulder ER 3 x 10    Shoulder abd 3x10  Shoulder flexion 1# 3x10   +Scaption 2 x 10  Seated lasts pull down at squat rack 30# 3x10   Ball walll (red  ball) 3x30 sec CW and CCW  wall clock 10 reps  YTB   Wall slide with lift off YTB 10x  Place 10 cones in the second shelf cabinet with 4# around wrist 3 trials   Matrix rows 50# 30x   +Seated shoulder abd and ER 90/90 YTB 30x  SL shoulder ER eccentric 2x10  SL shoulder flexion  1# 3x10  SL shoulder abd  +2# 3x10  Prone T's 1# 3x10  Prone Y's 3x10   supine chest press 5# wand 3x10   Seated  shoulder flexion with wand 5# 30x  Supine serratus punch 5# wand 3x10   Body blade at 0 deg flexion/abd 3x30 sec    received the following manual therapy techniques: Soft tissue Mobilization were applied to the: L shoulder  For 15 minutes, including:  STM infraspinatus, UT, supraspinatus  PROM shoulder abd and ER  Shoulder oscillation   Joint mobs grade I    PATIENT EDUCATION AND HOME EXERCISES     Home Exercises Provided and Patient Education Provided     Education provided: .       Written Home Exercises Provided: Patient instructed to cont prior HEP. Exercises were reviewed and Markell was able to demonstrate them prior to the end of the session.  Markell demonstrated good  understanding of the education provided. See EMR under Patient Instructions for exercises provided during therapy sessions    ASSESSMENT     Pt is 17 weeks and 1 day(s) out of surgery. Pt tolerated tx well. During shoulder flexion, pt displays excessive scapular upward rotation. Pt also displays weakness into his infraspinatus and lacks proper activation into his supraspinatus. As a result, pt was progressed with scaption and shoulder ER exercises. Therapist followed primary PT's POC. Therapist provided verbal/tactile cues to maintain proper form. Pt reported no adverse effects to exercises. Pt would benefit from continued skilled physical therapy in order to reach pt's goals.     Markell is progressing well towards his goals.   Pt prognosis is Excellent.     Pt will continue to benefit from skilled outpatient physical therapy to address the deficits listed in the  problem list box on initial evaluation, provide pt/family education and to maximize pt's level of independence in the home and community environment.     Pt's spiritual, cultural and educational needs considered and pt agreeable to plan of care and goals.     Anticipated barriers to physical therapy: Transportation     GOALS: Short Term Goals: 4 weeks  1.Report decreased in pain at worse less than  <   / =  5  /10  to increase tolerance for functional mobility. Goal met 12-  2. Pt to improve L shoulder PROM to 90 deg flexion to allow for improved functional mobility to allow for improvement in IADLs. Goal met 12-  3. Pt will be out of sling with proper gait pattern to improve community ambulation. Goal met 12-  4. Pt to report be conscious of impaired sitting and standing posture daily to decrease pain. Goal met 12-  5. Pt to tolerate HEP to improve ROM and independence with ADL's. Goal met 12-     Long Term Goals: 28 weeks  1.Report decreased in pain at worse less than  <   / =  1 /10  to increase tolerance for functional mobility. On going  2.  Patient will demonstrate improved overall function per FOTO Survey to CI = at least 1% but < 20% impaired, limited or restricted score or less.On going  3.Increased L shoulder  MMT 1 grade to increase tolerance for ADL and work activities.On going  4. Pt to report and demonstrate proper posture in standing and sitting to decrease pain. On going  5. Pt to be Independent with HEP to improve ROM and independence with ADL's.On going  6. Pt to improve  L shoulder active range of motion to 160 to 170 deg in flexion and abd, 60 to 70 deg ER and 70 deg of IR to allow for improved functional mobility to allow for improvement in IADLs. On going    PLAN     Plan of care Certification: 11/5/2021 to 7-5-22    Kiko Pizano, PT

## 2022-01-25 ENCOUNTER — CLINICAL SUPPORT (OUTPATIENT)
Dept: REHABILITATION | Facility: HOSPITAL | Age: 67
End: 2022-01-25
Attending: ORTHOPAEDIC SURGERY
Payer: MEDICARE

## 2022-01-25 DIAGNOSIS — M25.612 DECREASED RANGE OF MOTION OF LEFT SHOULDER: ICD-10-CM

## 2022-01-25 DIAGNOSIS — R29.898 SHOULDER WEAKNESS: ICD-10-CM

## 2022-01-25 DIAGNOSIS — Z98.890 S/P SHOULDER SURGERY: ICD-10-CM

## 2022-01-25 PROCEDURE — 97110 THERAPEUTIC EXERCISES: CPT | Mod: KX,HCNC,PN

## 2022-01-25 NOTE — PROGRESS NOTES
[facility]   Physical Therapy Treatment Note     Name: Markell Keys  Clinic Number: [id number]    Therapy Diagnosis:   Encounter Diagnoses   Name Primary?    S/P shoulder surgery     Decreased range of motion of left shoulder     Shoulder weakness      Physician: Carmencita Ramirez MD    Visit Date: 1/25/2022    Physician: Carmencita Ramirez MD     Physician Orders: PT Eval and Treat   Medical Diagnosis from Referral: Nontraumatic complete tear of left rotator cuff  Evaluation Date: 11/5/2021  Authorization Period Expiration: 12/31/2021  Plan of Care Expiration: 7-5-22  Visit # / Visits authorized:     7/20 13/20   Progress Note Due: 1-  FOTO: 1/1  PTA Visit #: 1/5     DOS: 9-     POW: 17 weeks 6 day ( as of 1-)   Precautions:   POSTOPERATIVE PLAN: Plan to follow subscapularis-supraspinatus repair protocol (>3 cm in total size). Passive and active assisted motion at 6 weeks. No biceps resistive activity x 8 weeks. No cuff resistive activity x 12 weeks.    Time In: 7:45 am  Time Out: 8:30 am  Total Billable Time: 45  minutes      SUBJECTIVE     Pt reports:that he has no pain right now, but he still has pain lifting over 2 lbs.  He was compliant with home exercise program.  Response to previous treatment: good; no pain or soreness  Functional change: No change    Pain: 0/10  Location: left shoulder      OBJECTIVE     Objective Measures updated at progress report unless specified.         Active Range of Motion (degrees):   Shoulder Right Left   Flexion 175 deg 170 deg No pain    Abduction 175 163 deg no pain    ER  T3 T2   IR  T7 T12       Upper Extremity Strength  (R) UE   (L) UE     Shoulder flexion: 4+/5 Shoulder flexion: 4-/5   Shoulder Abduction: 4+/5 Shoulder abduction: 4-/5   Shoulder ER 4+/5 Shoulder ER 4/5   Shoulder IR 4+/5 Shoulder IR  4-/5            TREATMENT       Markell received the treatments listed below:      received therapeutic exercises to  develop strength and ROM for 28  minutes including:    UBE 3/3 lvl 4.0  Standing shoulder extension on freemotion 10# 3x10  Standing shoulder ER RTB 30x  Standing shoulder IR +GTB 30x  Shoulder abd 2x10  Shoulder flexion 2x10   +Prone 90/90 shoulder ER 3 x 10    Ball walll (red ball) 3x30 sec CW and CCW  Seated lasts pull down at squat rack 30# 3x10   wall clock 10 reps  YTB   Wall slide with lift off YTB 10x  Place 10 cones in the second shelf cabinet with 4# around wrist 3 trials   Matrix rows 50# 30x   +Seated shoulder abd and ER 90/90 YTB 30x  SL shoulder ER eccentric 2x10  SL shoulder flexion  1# 3x10  SL shoulder abd  +2# 3x10  Prone T's 1# 3x10  Prone Y's 3x10   supine chest press 5# wand 3x10   Seated  shoulder flexion with wand 5# 30x  Supine serratus punch 5# wand 3x10   Body blade at 0 deg flexion/abd 3x30 sec    received the following manual therapy techniques: Soft tissue Mobilization were applied to the: L shoulder  For 00 minutes, including:  STM infraspinatus, UT, supraspinatus  PROM shoulder abd and ER  Shoulder oscillation   Joint mobs grade I    PATIENT EDUCATION AND HOME EXERCISES     Home Exercises Provided and Patient Education Provided     Education provided: .       Written Home Exercises Provided: Patient instructed to cont prior HEP. Exercises were reviewed and Markell was able to demonstrate them prior to the end of the session.  Markell demonstrated good  understanding of the education provided. See EMR under Patient Instructions for exercises provided during therapy sessions    ASSESSMENT     Pt was re-assessed today. Pt is s/p subscapularis-supraspinatus repair protocol (>3 cm in total size) on 9-. Pt is 17 weeks and 6 days post-op. Pt demonstrated improvement in L shoulder AROM and muscles strength (see above). Pt has 170 deg of L shoulder flexion and abd with no pain. Pt able to reach T-12 IR and T2 ER. MMT demonstrated 4-/5 flexion, abd, IR, but he has improved L shoulder ER muscles  strength. DASH demonstrated good scores today. Pt has met 4/6 LTGs. Overall, pt has improved L shoulder AROM, but he cont with weakness or L rotator cuff and jose-scapular muscles. Pt will cont benefiting from skilled PT services to return to work.     Markell is progressing well towards his goals.   Pt prognosis is Excellent.     Pt will continue to benefit from skilled outpatient physical therapy to address the deficits listed in the problem list box on initial evaluation, provide pt/family education and to maximize pt's level of independence in the home and community environment.     Pt's spiritual, cultural and educational needs considered and pt agreeable to plan of care and goals.     Anticipated barriers to physical therapy: Transportation     GOALS: Short Term Goals: 4 weeks  1.Report decreased in pain at worse less than  <   / =  5  /10  to increase tolerance for functional mobility. Goal met 12-  2. Pt to improve L shoulder PROM to 90 deg flexion to allow for improved functional mobility to allow for improvement in IADLs. Goal met 12-  3. Pt will be out of sling with proper gait pattern to improve community ambulation. Goal met 12-  4. Pt to report be conscious of impaired sitting and standing posture daily to decrease pain. Goal met 12-  5. Pt to tolerate HEP to improve ROM and independence with ADL's. Goal met 12-     Long Term Goals: 28 weeks  1.Report decreased in pain at worse less than  <   / =  1 /10  to increase tolerance for functional mobility. Goal met 1-  2.  Patient will demonstrate improved overall function per FOTO Survey to CI = at least 1% but < 20% impaired, limited or restricted score or less.Goal not met 1-25-22  3.Increased L shoulder  MMT 1 grade to increase tolerance for ADL and work activities. Goal not met 1-25-22  4. Pt to report and demonstrate proper posture in standing and sitting to decrease pain. Goal met 1-25-22  5. Pt to be  Independent with HEP to improve ROM and independence with ADL's.Goal met 1-25-22  6. Pt to improve  L shoulder active range of motion to 160 to 170 deg in flexion and abd, 60 to 70 deg ER and 70 deg of IR to allow for improved functional mobility to allow for improvement in IADLs. Goal met 1-    PLAN     Plan of care Certification: 11/5/2021 to 7-5-22    Armando García, PT

## 2022-01-27 ENCOUNTER — CLINICAL SUPPORT (OUTPATIENT)
Dept: REHABILITATION | Facility: HOSPITAL | Age: 67
End: 2022-01-27
Attending: ORTHOPAEDIC SURGERY
Payer: MEDICARE

## 2022-01-27 DIAGNOSIS — M25.612 DECREASED RANGE OF MOTION OF LEFT SHOULDER: ICD-10-CM

## 2022-01-27 DIAGNOSIS — R29.898 SHOULDER WEAKNESS: ICD-10-CM

## 2022-01-27 DIAGNOSIS — Z98.890 S/P SHOULDER SURGERY: ICD-10-CM

## 2022-01-27 PROCEDURE — 97110 THERAPEUTIC EXERCISES: CPT | Mod: HCNC,PN

## 2022-01-27 NOTE — PROGRESS NOTES
[facility]   Physical Therapy Treatment Note     Name: Markell Keys  Clinic Number: [id number]    Therapy Diagnosis:   Encounter Diagnoses   Name Primary?    S/P shoulder surgery     Decreased range of motion of left shoulder     Shoulder weakness      Physician: Carmencita Ramirez MD    Visit Date: 1/27/2022    Physician: Carmencita Ramirez MD     Physician Orders: PT Eval and Treat   Medical Diagnosis from Referral: Nontraumatic complete tear of left rotator cuff  Evaluation Date: 11/5/2021  Authorization Period Expiration: 12/31/2021  Plan of Care Expiration: 7-5-22  Visit # / Visits authorized:     8/20 13/20   Progress Note Due: 1-  FOTO: 1/1  PTA Visit #: 1/5     DOS: 9-     POW: 18 weeks 1 day ( as of 1-)   Precautions:   POSTOPERATIVE PLAN: Plan to follow subscapularis-supraspinatus repair protocol (>3 cm in total size). Passive and active assisted motion at 6 weeks. No biceps resistive activity x 8 weeks. No cuff resistive activity x 12 weeks.    Time In: 7:00 am  Time Out: 7:45 am  Total Billable Time: 45  minutes      SUBJECTIVE     Pt reports:that he has no pain right now, but he cont to experience L shoulder weakness  He was compliant with home exercise program.  Response to previous treatment: good; no pain or soreness  Functional change: No change    Pain: 0/10  Location: left shoulder      OBJECTIVE       TREATMENT       Markell received the treatments listed below:      received therapeutic exercises to develop strength and ROM for 28  minutes including:    UBE 3/3 lvl 4.0  Standing shoulder extension on freemotion 30# 3x10  Standing shoulder ER RTB 30x with mirror for visual cues  Matrix Standing shoulder IR 3# 3x10  Shoulder abd 2x10  Shoulder flexion 2x10   +Prone 90/90 shoulder ER 3 x 10    Ball walll (red ball) 3x30 sec CW and CCW  Seated lasts pull down at squat rack 30# 3x10   wall clock 10 reps  YTB   Wall slide with lift off YTB  10x  Place 10 cones in the second shelf cabinet with 4# around wrist 3 trials   Matrix rows 50# 30x   +Seated shoulder abd and ER 90/90 YTB 30x  SL shoulder ER eccentric 2x10  SL shoulder flexion 2# 3x10  SL shoulder abd  +2# 3x10  Prone T's 1# 3x10  Prone Y's 3x10   supine chest press 5# wand 3x10   Seated  shoulder flexion with wand 5# 30x  Supine serratus punch 5# wand 3x10   Body blade at 0 deg flexion/abd 3x30 sec    received the following manual therapy techniques: Soft tissue Mobilization were applied to the: L shoulder  For 00 minutes, including:  STM infraspinatus, UT, supraspinatus  PROM shoulder abd and ER  Shoulder oscillation   Joint mobs grade I    PATIENT EDUCATION AND HOME EXERCISES     Home Exercises Provided and Patient Education Provided     Education provided: .       Written Home Exercises Provided: Patient instructed to cont prior HEP. Exercises were reviewed and Markell was able to demonstrate them prior to the end of the session.  Markell demonstrated good  understanding of the education provided. See EMR under Patient Instructions for exercises provided during therapy sessions    ASSESSMENT     Pt was re-assessed today. Pt is s/p subscapularis-supraspinatus repair protocol (>3 cm in total size) on 9-. Pt is 18 weeks and 1 days post-op. Progression of weights and T-band color today. Pt responded well to exercises. Increase rotator cuff and jose-scapular muscles fatigue Pt cont with decrease muscles strength and muscles endurance of L rotator cuff and jose-scapular muscles. Pt will cont benefiting from skilled PT services to return to work.     Markell is progressing well towards his goals.   Pt prognosis is Excellent.     Pt will continue to benefit from skilled outpatient physical therapy to address the deficits listed in the problem list box on initial evaluation, provide pt/family education and to maximize pt's level of independence in the home and community environment.     Pt's spiritual,  cultural and educational needs considered and pt agreeable to plan of care and goals.     Anticipated barriers to physical therapy: Transportation     GOALS: Short Term Goals: 4 weeks  1.Report decreased in pain at worse less than  <   / =  5  /10  to increase tolerance for functional mobility. Goal met 12-  2. Pt to improve L shoulder PROM to 90 deg flexion to allow for improved functional mobility to allow for improvement in IADLs. Goal met 12-  3. Pt will be out of sling with proper gait pattern to improve community ambulation. Goal met 12-  4. Pt to report be conscious of impaired sitting and standing posture daily to decrease pain. Goal met 12-  5. Pt to tolerate HEP to improve ROM and independence with ADL's. Goal met 12-     Long Term Goals: 28 weeks  1.Report decreased in pain at worse less than  <   / =  1 /10  to increase tolerance for functional mobility. Goal met 1-  2.  Patient will demonstrate improved overall function per FOTO Survey to CI = at least 1% but < 20% impaired, limited or restricted score or less.Goal not met 1-25-22  3.Increased L shoulder  MMT 1 grade to increase tolerance for ADL and work activities. Goal not met 1-25-22  4. Pt to report and demonstrate proper posture in standing and sitting to decrease pain. Goal met 1-25-22  5. Pt to be Independent with HEP to improve ROM and independence with ADL's.Goal met 1-25-22  6. Pt to improve  L shoulder active range of motion to 160 to 170 deg in flexion and abd, 60 to 70 deg ER and 70 deg of IR to allow for improved functional mobility to allow for improvement in IADLs. Goal met 1-    PLAN     Plan of care Certification: 11/5/2021 to 7-5-22    Armando García, PT

## 2022-02-05 LAB — PSA SERPL-MCNC: 0.5 NG/ML (ref 0–4)

## 2022-06-17 ENCOUNTER — PES CALL (OUTPATIENT)
Dept: ADMINISTRATIVE | Facility: CLINIC | Age: 67
End: 2022-06-17
Payer: MEDICARE

## 2023-02-07 DIAGNOSIS — Z00.00 ENCOUNTER FOR MEDICARE ANNUAL WELLNESS EXAM: ICD-10-CM

## 2023-02-09 DIAGNOSIS — Z00.00 ENCOUNTER FOR MEDICARE ANNUAL WELLNESS EXAM: ICD-10-CM

## (undated) DEVICE — MAT SUCTION PUDDLEVAC ORANGE

## (undated) DEVICE — DRESSING AQUACEL FOAM 5 X 5

## (undated) DEVICE — SLING ARM ULTRA III PAD LG

## (undated) DEVICE — SUT 3/0 36IN COATED VICRYL

## (undated) DEVICE — SEE MEDLINE ITEM 157110

## (undated) DEVICE — SUT ETHILON 3-0 PS2 18 BLK

## (undated) DEVICE — SOL IRR NACL .9% 3000ML

## (undated) DEVICE — ADHESIVE DERMABOND ADVANCED

## (undated) DEVICE — BLANKET LOWER BODY 55.9X40.2IN

## (undated) DEVICE — CANNULA TRIPLEDAM 6MM X 7CM

## (undated) DEVICE — DRAPE PLASTIC U 60X72

## (undated) DEVICE — DRESSING GAUZE XEROFORM 5X9

## (undated) DEVICE — UNDERGLOVES BIOGEL PI SZ 7 LF

## (undated) DEVICE — NDL SPINAL 20GX3.5 HUB

## (undated) DEVICE — PACK ARTHROSCOPY W/ISO BAC

## (undated) DEVICE — SEE MEDLINE ITEM 146345

## (undated) DEVICE — SEE MEDLINE ITEM 152529

## (undated) DEVICE — PAD ABD 8X10 STERILE

## (undated) DEVICE — SPONGE GAUZE 16PLY 4X4

## (undated) DEVICE — GLOVE BIOGEL PI MICRO SZ 6.5

## (undated) DEVICE — SUT VICRYL 3-0 OB 36 CT-1

## (undated) DEVICE — SEE MEDLINE ITEM 157166

## (undated) DEVICE — GLOVE SURGICAL LATEX SZ 7

## (undated) DEVICE — STOCKINET 4INX48

## (undated) DEVICE — MAT QUICK 40X30 FLOOR FLUID LF

## (undated) DEVICE — SYR ONLY LUER LOCK 20CC

## (undated) DEVICE — APPLICATOR CHLORAPREP ORN 26ML

## (undated) DEVICE — DRESSING TRANS 4X4 TEGADERM

## (undated) DEVICE — NDL ARTHSCP MF SCORPION

## (undated) DEVICE — PEROXIDE HYDROGEN 3% 16OZ

## (undated) DEVICE — KIT TRACTION SHLDR ST DISP LF

## (undated) DEVICE — PAD SHOULDER CARE POLAR

## (undated) DEVICE — ELECTRODE REM PLYHSV RETURN 9

## (undated) DEVICE — SEE MEDLINE ITEM 152622

## (undated) DEVICE — CANISTER SUCTION 2 LTR

## (undated) DEVICE — TUBE SET INFLOW/OUTFLOW

## (undated) DEVICE — UNDERGLOVE BIOGEL PI SZ 6.5 LF

## (undated) DEVICE — SEE MEDLINE ITEM 157165

## (undated) DEVICE — BLADE SURG CARBON STEEL SZ11

## (undated) DEVICE — SUT 0 36IN PDS II VIO MONO

## (undated) DEVICE — NDL SAFETY 22G X 1.5 ECLIPSE

## (undated) DEVICE — Device

## (undated) DEVICE — DRAPE INCISE IOBAN 2 23X17IN

## (undated) DEVICE — DRAPE STERI INSTRUMENT 1018

## (undated) DEVICE — NDL SPINAL 18GX3.5 SPINOCAN

## (undated) DEVICE — SUT MCRYL PLUS 4-0 PS2 27IN

## (undated) DEVICE — PAD SHOULDER POLAR CARE XL